# Patient Record
Sex: FEMALE | Race: WHITE | Employment: FULL TIME | URBAN - METROPOLITAN AREA
[De-identification: names, ages, dates, MRNs, and addresses within clinical notes are randomized per-mention and may not be internally consistent; named-entity substitution may affect disease eponyms.]

---

## 2023-06-19 ENCOUNTER — HOSPITAL ENCOUNTER (OUTPATIENT)
Dept: ULTRASOUND IMAGING | Facility: CLINIC | Age: 28
Discharge: HOME | End: 2023-06-19
Attending: FAMILY MEDICINE | Admitting: FAMILY MEDICINE
Payer: COMMERCIAL

## 2023-06-19 ENCOUNTER — OFFICE VISIT (OUTPATIENT)
Dept: PEDIATRICS | Facility: CLINIC | Age: 28
End: 2023-06-19
Payer: COMMERCIAL

## 2023-06-19 ENCOUNTER — OFFICE VISIT (OUTPATIENT)
Dept: URGENT CARE | Facility: URGENT CARE | Age: 28
End: 2023-06-19
Payer: COMMERCIAL

## 2023-06-19 VITALS
WEIGHT: 162 LBS | DIASTOLIC BLOOD PRESSURE: 85 MMHG | HEIGHT: 70 IN | OXYGEN SATURATION: 100 % | BODY MASS INDEX: 23.19 KG/M2 | SYSTOLIC BLOOD PRESSURE: 128 MMHG | HEART RATE: 64 BPM | TEMPERATURE: 97.5 F

## 2023-06-19 VITALS
OXYGEN SATURATION: 100 % | DIASTOLIC BLOOD PRESSURE: 79 MMHG | SYSTOLIC BLOOD PRESSURE: 127 MMHG | RESPIRATION RATE: 15 BRPM | TEMPERATURE: 98.8 F | HEART RATE: 69 BPM

## 2023-06-19 DIAGNOSIS — R10.2 HYPOGASTRIC PAIN: ICD-10-CM

## 2023-06-19 DIAGNOSIS — R10.2 PELVIC PAIN IN FEMALE: Primary | ICD-10-CM

## 2023-06-19 DIAGNOSIS — R10.31 RLQ ABDOMINAL PAIN: ICD-10-CM

## 2023-06-19 DIAGNOSIS — R10.31 RLQ ABDOMINAL PAIN: Primary | ICD-10-CM

## 2023-06-19 LAB
ALBUMIN SERPL BCG-MCNC: 4.4 G/DL (ref 3.5–5.2)
ALP SERPL-CCNC: 57 U/L (ref 35–104)
ALT SERPL W P-5'-P-CCNC: 17 U/L (ref 0–50)
ANION GAP SERPL CALCULATED.3IONS-SCNC: 9 MMOL/L (ref 7–15)
AST SERPL W P-5'-P-CCNC: 16 U/L (ref 0–45)
BASOPHILS # BLD AUTO: 0 10E3/UL (ref 0–0.2)
BASOPHILS NFR BLD AUTO: 0 %
BILIRUB SERPL-MCNC: 0.3 MG/DL
BUN SERPL-MCNC: 10.7 MG/DL (ref 6–20)
CALCIUM SERPL-MCNC: 9.8 MG/DL (ref 8.6–10)
CHLORIDE SERPL-SCNC: 105 MMOL/L (ref 98–107)
CREAT SERPL-MCNC: 0.77 MG/DL (ref 0.51–0.95)
DEPRECATED HCO3 PLAS-SCNC: 26 MMOL/L (ref 22–29)
EOSINOPHIL # BLD AUTO: 0 10E3/UL (ref 0–0.7)
EOSINOPHIL NFR BLD AUTO: 0 %
ERYTHROCYTE [DISTWIDTH] IN BLOOD BY AUTOMATED COUNT: 11.7 % (ref 10–15)
GFR SERPL CREATININE-BSD FRML MDRD: >90 ML/MIN/1.73M2
GLUCOSE SERPL-MCNC: 101 MG/DL (ref 70–99)
HCG SERPL QL: NEGATIVE
HCT VFR BLD AUTO: 39.4 % (ref 35–47)
HGB BLD-MCNC: 13.5 G/DL (ref 11.7–15.7)
IMM GRANULOCYTES # BLD: 0 10E3/UL
IMM GRANULOCYTES NFR BLD: 0 %
LYMPHOCYTES # BLD AUTO: 2.4 10E3/UL (ref 0.8–5.3)
LYMPHOCYTES NFR BLD AUTO: 47 %
MCH RBC QN AUTO: 32.4 PG (ref 26.5–33)
MCHC RBC AUTO-ENTMCNC: 34.3 G/DL (ref 31.5–36.5)
MCV RBC AUTO: 95 FL (ref 78–100)
MONOCYTES # BLD AUTO: 0.3 10E3/UL (ref 0–1.3)
MONOCYTES NFR BLD AUTO: 6 %
NEUTROPHILS # BLD AUTO: 2.4 10E3/UL (ref 1.6–8.3)
NEUTROPHILS NFR BLD AUTO: 47 %
NRBC # BLD AUTO: 0 10E3/UL
NRBC BLD AUTO-RTO: 0 /100
PLATELET # BLD AUTO: 197 10E3/UL (ref 150–450)
POTASSIUM SERPL-SCNC: 4.6 MMOL/L (ref 3.4–5.3)
PROT SERPL-MCNC: 7 G/DL (ref 6.4–8.3)
RBC # BLD AUTO: 4.17 10E6/UL (ref 3.8–5.2)
SODIUM SERPL-SCNC: 140 MMOL/L (ref 136–145)
WBC # BLD AUTO: 5.1 10E3/UL (ref 4–11)

## 2023-06-19 PROCEDURE — 85025 COMPLETE CBC W/AUTO DIFF WBC: CPT

## 2023-06-19 PROCEDURE — 80053 COMPREHEN METABOLIC PANEL: CPT

## 2023-06-19 PROCEDURE — 76830 TRANSVAGINAL US NON-OB: CPT

## 2023-06-19 PROCEDURE — 99204 OFFICE O/P NEW MOD 45 MIN: CPT

## 2023-06-19 PROCEDURE — 84703 CHORIONIC GONADOTROPIN ASSAY: CPT

## 2023-06-19 PROCEDURE — 99207 REFERRAL TO ACUTE AND DIAGNOSTIC SERVICES: CPT

## 2023-06-19 PROCEDURE — 36415 COLL VENOUS BLD VENIPUNCTURE: CPT

## 2023-06-19 NOTE — PROGRESS NOTES
Assessment & Plan   Problem List Items Addressed This Visit    None  Visit Diagnoses     Pelvic pain in female    -  Primary    Relevant Orders    CBC with platelets differential (Completed)    Comprehensive metabolic panel (Completed)    HCG qualitative (Completed)    US Pelvic Complete with Transvaginal (Completed)    Hypogastric pain              CS ACUTE [facility] DIAG [facility] PROVIDER  [facility] HAL West is a 27 year old, presenting for the following health issues:  Abdominal Pain (Abdominal pain- 8 days, since last monday)    HPI     Abdominal/Flank Pain  Onset/Duration: eight days   Description:   Character: Dull ache  Location: right lower quadrant  Radiation: None  Intensity: 3/10  Progression of Symptoms:  intermittent  Accompanying Signs & Symptoms:  Fever/chills: no   Gas/Bloating: YES  Nausea: no   Vomitting: no   Diarrhea: no   Constipation:YES  Dysuria: no            Hematuria: no            Frequency: no            Incontinence of urine: no   History:   Trauma: no   Previous similar pain: YES- previous cysts   Previous tests done: US two years ago           Previous Abdominal surgery: no   Precipitating factors:   Does the pain change with:     Food: no      Bowel Movement: no     Urination: no              Other factors: no   Therapies tried and outcome:  Ibuprofen     When food last eaten: this morning    27-year-old female presented today planing of 8 days of right lower quadrant/hypogastric pain.  The pain is mild up to a 3/10 intensity.  Patient has had previous ovarian cyst.  She states she is currently at her midcycle, trying to get pregnant.  LMP = June 5, 2023; usual period lasting 4-5 days; 25-day cycle.  Normal urination.  No STD concerns.  No abnormal discharge.  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  Denies any trauma or injuries.      Review of Systems   See HPI.  No nausea/vomiting.  No fever/chills.  No chest pain/SOB.   No BM/urine problems.  No dizziness or syncope.        Objective    /79 (BP Location: Right arm, Patient Position: Chair, Cuff Size: Adult Regular)   Pulse 69   Temp 98.8  F (37.1  C) (Oral)   Resp 15   LMP 06/05/2023   SpO2 100%   There is no height or weight on file to calculate BMI.  Physical Exam   GENERAL: healthy, alert and no distress; afebrile, not septic, no cyanosis or accessory muscle use, moist mucous membrane  HEENT:normal, no adenopathy, neck supple, no adenopathy  RESP: lungs clear   CV: regular rate and rhythm, normal heart sounds, no murmur  ABDOMEN: soft, nontender, no hepatosplenomegaly, no masses and bowel sounds normal; negative Molina negative McBurney, no CVA tenderness; mild tenderness at the right suprapubic region without rebound; no hernia  MS: no gross musculoskeletal defects noted, no edema  SKIN: no suspicious lesions or rashes  NEURO: Normal strength and tone, mentation intact and speech normal  BACK: no CVA tenderness, no paralumbar tenderness    Labs: Serum pregnancy test negative.  CBC and differential and CMP essentially normal.    Imaging: Pelvic and transvaginal ultrasound normal--- no ovarian cysts or torsion; no ectopic pregnancy.    Assessment: No pathology found for right lower quadrant/hypogastric pain.   Could be musculoskeletal.  Clinically benign.    Plan: Hypogastric pain could be musculoskeletal, likely self-limiting.  Close observation for now.  May use acetaminophen as needed as directed for pain.  Follow-up primary care physician within 2 weeks if no improvement or new problems arise.  Warning signs and symptoms explained, to be seen ASAP if worsening.

## 2023-06-19 NOTE — PROGRESS NOTES
"  Assessment & Plan     RLQ abdominal pain    - Referral to Acute and Diagnostic Services (Day of diagnostic / First order acute); Future    Referred to ADS to eval for possible ovarian cyst.  Appreciate consult.    Jenna David MD   Urgent Care Provider  [facility] HAL West is a 27 year old, presenting for the following health issues:  Abdominal Pain (Pain in RLQ x 8 days - pt is sometimes, constipation -nausea intermittent )    HPI     Worsening RLQ pain x 8 days.  LMP 6/5/2023.  Trying to get pregnant.  No fever or chills.  Has had constipation in past.  Feels pain has not been severe but has been constant.  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    Review of Systems   Constitutional, HEENT, cardiovascular, pulmonary, GI, , musculoskeletal, neuro, skin, endocrine and psych systems are negative, except as otherwise noted.      Objective    /85   Pulse 64   Temp 97.5  F (36.4  C) (Tympanic)   Ht 1.765 m (5' 9.5\")   Wt 73.5 kg (162 lb)   LMP 06/05/2023   SpO2 100%   BMI 23.58 kg/m    Body mass index is 23.58 kg/m .  Physical Exam   GENERAL: healthy, alert and no distress  EYES: Eyes grossly normal to inspection, PERRL and conjunctivae and sclerae normal  ABDOMEN: RLQ tenderness, no guarding or rebound  MS: no gross musculoskeletal defects noted, no edema  PSYCH: mentation appears normal, affect normal/bright                    "

## 2023-06-21 NOTE — PROGRESS NOTES
Jenna is a 27 year old No obstetric history on file. female who presents for annual exam.     Besides routine health maintenance, she has no other health concerns today.    HPI:  The patient's PCP is Physician No Ref-Primary.  New patient to me here today for her annual GYN exam.  She is due for a Pap smear today.  She unfortunately had a spontaneous miscarriage this past winter.  Her cycles have resumed at their normalcy.  She does have some cramping but is abated by ibuprofen.    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      GYNECOLOGIC HISTORY:    Patient's last menstrual period was 2023.    Regular menses? yes  Menses every 23-25 days.  Length of menses: 4-5 days    Her current contraception method is: none, currently trying to conceive.  She  reports that she has never smoked. She has never used smokeless tobacco.    Patient is sexually active.  STD testing offered?  Declined    Last GAD7 score on record =       2023     8:31 AM   JASON-7 SCORE   Total Score 0     Alcohol Score = 2    HEALTH MAINTENANCE:    Overdue    Never   Done ADVANCE CARE PLANNING (Every 5 Years)     Never   Done HEPATITIS B IMMUNIZATION (1 of 3 - 3-dose series)     Never   Done COVID-19 Vaccine (1)     Never   Done HIV SCREENING (Once)     Never   Done HEPATITIS C SCREENING (Once)     Never   Done PAP (Every 3 Years)     Never   Done DTAP/TDAP/TD IMMUNIZATION (1 - Tdap)     Upcoming    SEP 1   2023 INFLUENZA VACCINE (Season Ended)         Health maintenance updated:  yes    HISTORY:  OB History    Para Term  AB Living   1 0 0 0 1 0   SAB IAB Ectopic Multiple Live Births   1 0 0 0 0      # Outcome Date GA Lbr Adam/2nd Weight Sex Delivery Anes PTL Lv   1 SAB 23 5w0d              There is no problem list on file for this patient.    Past Surgical History:   Procedure Laterality Date     TONSILLECTOMY & ADENOIDECTOMY  2017      Social History  "    Tobacco Use     Smoking status: Never     Smokeless tobacco: Never   Substance Use Topics     Alcohol use: Yes      Problem (# of Occurrences) Relation (Name,Age of Onset)    Diabetes (1) Maternal Grandmother    Hypertension (1) Father    Breast Cancer (1) Paternal Grandmother    Prostate Cancer (1) Maternal Grandfather    Squamous cell carcinoma (1) Paternal Grandmother: tongue    Hyperlipidemia (1) Mother    Bladder Cancer (1) Paternal Grandfather    Leukemia (1) Maternal Grandmother    Lung Cancer (1) Paternal Grandmother            Current Outpatient Medications   Medication Sig     Prenatal Vit-Fe Fumarate-FA (PRENATAL MULTIVITAMIN W/IRON) 27-0.8 MG tablet Take 1 tablet by mouth daily     No current facility-administered medications for this visit.     No Known Allergies    Past medical, surgical, social and family histories were reviewed and updated in EPIC.    ROS:   12 point review of systems negative other than symptoms noted below or in the HPI.  No urinary frequency or dysuria, bladder or kidney problems, Normal menstrual cycles    EXAM:  /62   Ht 1.759 m (5' 9.25\")   Wt 72.6 kg (160 lb)   LMP 06/05/2023   BMI 23.46 kg/m     BMI: Body mass index is 23.46 kg/m .    PHYSICAL EXAM:  Constitutional:   Appearance: Well nourished, well developed, alert, in no acute distress  Neck:  Lymph Nodes:  No lymphadenopathy present    Thyroid:  Gland size normal, nontender, no nodules or masses present  on palpation  Chest:  Respiratory Effort:  Breathing unlabored  Cardiovascular:    Heart: Auscultation:  Regular rate, normal rhythm, no murmurs present  Breasts: Inspection of Breasts:  No lymphadenopathy present., Palpation of Breasts and Axillae:  No masses present on palpation, no breast tenderness., Axillary Lymph Nodes:  No lymphadenopathy present. and No nodularity, asymmetry or nipple discharge bilaterally.  Gastrointestinal:   Abdominal Examination:  Abdomen nontender to palpation, tone normal " without rigidity or guarding, no masses present, umbilicus without lesions   Liver and Spleen:  No hepatomegaly present, liver nontender to palpation    Hernias:  No hernias present  Lymphatic: Lymph Nodes:  No other lymphadenopathy present  Skin:  General Inspection:  No rashes present, no lesions present, no areas of  discoloration  Neurologic:    Mental Status:  Oriented X3.  Normal strength and tone, sensory exam                grossly normal, mentation intact and speech normal.    Psychiatric:   Mentation appears normal and affect normal/bright.         Pelvic Exam:  External Genitalia:     Normal appearance for age, no discharge present, no tenderness present, no inflammatory lesions present, color normal  Vagina:     Normal vaginal vault without central or paravaginal defects, no discharge present, no inflammatory lesions present, no masses present  Bladder:     Nontender to palpation  Urethra:   Urethral Body:  Urethra palpation normal, urethra structural support normal   Urethral Meatus:  No erythema or lesions present  Cervix:     Appearance healthy, no lesions present, nontender to palpation, no bleeding present  Uterus:     Uterus: firm, normal sized and nontender, anteverted in position.   Adnexa:     No adnexal tenderness present, no adnexal masses present  Perineum:     Perineum within normal limits, no evidence of trauma, no rashes or skin lesions present  Anus:     Anus within normal limits, no hemorrhoids present  Inguinal Lymph Nodes:     No lymphadenopathy present  Pubic Hair:     Normal pubic hair distribution for age  Genitalia and Groin:     No rashes present, no lesions present, no areas of discoloration, no masses present      COUNSELING:   Special attention given to:        Regular exercise       Healthy diet/nutrition       Family planning    BMI: Body mass index is 23.46 kg/m .      ASSESSMENT:  27 year old female with satisfactory annual exam.    ICD-10-CM    1. Encounter for  gynecological examination without abnormal finding  Z01.419 Pap screen reflex to HPV if ASCUS - recommend age 25 - 29          PLAN:  27-year-old female with a normal exam.  She is to call with a positive pregnancy test.  Pap smear was collected and if it is normal she can repeat in 3 years.    MARIAMA Shrestha CNP

## 2023-06-22 ENCOUNTER — OFFICE VISIT (OUTPATIENT)
Dept: OBGYN | Facility: CLINIC | Age: 28
End: 2023-06-22
Payer: COMMERCIAL

## 2023-06-22 VITALS
DIASTOLIC BLOOD PRESSURE: 62 MMHG | WEIGHT: 160 LBS | SYSTOLIC BLOOD PRESSURE: 104 MMHG | BODY MASS INDEX: 23.7 KG/M2 | HEIGHT: 69 IN

## 2023-06-22 DIAGNOSIS — Z01.419 ENCOUNTER FOR GYNECOLOGICAL EXAMINATION WITHOUT ABNORMAL FINDING: Primary | ICD-10-CM

## 2023-06-22 PROCEDURE — G0145 SCR C/V CYTO,THINLAYER,RESCR: HCPCS | Performed by: NURSE PRACTITIONER

## 2023-06-22 PROCEDURE — 99385 PREV VISIT NEW AGE 18-39: CPT | Performed by: NURSE PRACTITIONER

## 2023-06-22 RX ORDER — PRENATAL VIT/IRON FUM/FOLIC AC 27MG-0.8MG
1 TABLET ORAL DAILY
COMMUNITY

## 2023-06-22 ASSESSMENT — ANXIETY QUESTIONNAIRES
3. WORRYING TOO MUCH ABOUT DIFFERENT THINGS: NOT AT ALL
5. BEING SO RESTLESS THAT IT IS HARD TO SIT STILL: NOT AT ALL
1. FEELING NERVOUS, ANXIOUS, OR ON EDGE: NOT AT ALL
2. NOT BEING ABLE TO STOP OR CONTROL WORRYING: NOT AT ALL
GAD7 TOTAL SCORE: 0
6. BECOMING EASILY ANNOYED OR IRRITABLE: NOT AT ALL
GAD7 TOTAL SCORE: 0
7. FEELING AFRAID AS IF SOMETHING AWFUL MIGHT HAPPEN: NOT AT ALL

## 2023-06-22 ASSESSMENT — PATIENT HEALTH QUESTIONNAIRE - PHQ9
5. POOR APPETITE OR OVEREATING: NOT AT ALL
SUM OF ALL RESPONSES TO PHQ QUESTIONS 1-9: 0

## 2023-06-26 LAB
BKR LAB AP GYN ADEQUACY: NORMAL
BKR LAB AP GYN INTERPRETATION: NORMAL
BKR LAB AP HPV REFLEX: NORMAL
BKR LAB AP PREVIOUS ABNORMAL: NORMAL
PATH REPORT.COMMENTS IMP SPEC: NORMAL
PATH REPORT.COMMENTS IMP SPEC: NORMAL
PATH REPORT.RELEVANT HX SPEC: NORMAL

## 2023-07-12 ENCOUNTER — VIRTUAL VISIT (OUTPATIENT)
Dept: OBGYN | Facility: CLINIC | Age: 28
End: 2023-07-12
Payer: COMMERCIAL

## 2023-07-12 DIAGNOSIS — Z34.01 ENCOUNTER FOR SUPERVISION OF NORMAL FIRST PREGNANCY IN FIRST TRIMESTER: Primary | ICD-10-CM

## 2023-07-12 DIAGNOSIS — O36.80X0 PREGNANCY WITH INCONCLUSIVE FETAL VIABILITY: ICD-10-CM

## 2023-07-12 PROCEDURE — 99207 PR NO CHARGE NURSE ONLY: CPT

## 2023-07-12 NOTE — PROGRESS NOTES
SUBJECTIVE:     HPI:    This is a 28 year old female patient,  who presents for her first obstetrical visit.    RAO: 3/11/2024, by Last Menstrual Period.  She is 5w2d weeks.  Her cycles are regular.  Her last menstrual period was normal.   Since her LMP, she has had no complaints).   She denies nausea, emesis, abdominal pain, fatigue, headache, loss of appetite, vaginal discharge, dysuria, pelvic pain, urinary urgency, lightheadedness, urinary frequency, vaginal bleeding, hemorrhoids and constipation.    Additional History: [employer]  Nervous with hx of miscarriage    Pt's nephew Dx with Down Syndrome and Heart defect. Declines NIPS at this time as it would not change anything.    [facility] [email or web address]    Have you travelled during the pregnancy?No  Have your sexual partner(s) travelled during the pregnancy?No      HISTORY:   Planned Pregnancy: Yes  Marital Status:   Occupation: CT Tech  Living in Household: Spouse    Past History:  Her past medical history   Past Medical History:   Diagnosis Date     Known health problems: none    .      She has a history of  First Pregnancy    Since her last LMP she denies use of alcohol, tobacco and street drugs.    Past medical, surgical, social and family history were reviewed and updated in [email or web address].      Current Outpatient Medications   Medication     Prenatal Vit-Fe Fumarate-FA (PRENATAL MULTIVITAMIN W/IRON) 27-0.8 MG tablet     No current facility-administered medications for this visit.       ROS:       OBJECTIVE:     EXAM:  LMP 2023  There is no height or weight on file to calculate BMI.      ASSESSMENT/PLAN:       ICD-10-CM    1. Encounter for supervision of normal first pregnancy in first trimester  Z34.01           28 year old , 5w2d weeks of pregnancy with RAO of 3/11/2024, by Last Menstrual Period    Nurse phone visit completed. Prenatal book and folder (containing standard educational hand-outs and brochures) will be given at next visit to  patient. Information in folder reviewed over the phone. Questions answered. Brochure given on optional screening available to assess chromosomal anomalies. Pt declines NIPS. Pt advised to call the clinic if she has any questions or concerns related to her pregnancy. Prenatal labs future ordered. New prenatal visit scheduled on 8/2 with Nael Daley RN on 7/12/2023 at 8:47 AM      No results found for: PAP  PHQ-9 score:        7/10/2023     2:46 PM   PHQ   PHQ-9 Total Score 0   Q9: Thoughts of better off dead/self-harm past 2 weeks Not at all                   6/22/2023     8:31 AM 7/10/2023     2:45 PM   JASON-7 SCORE   Total Score  0 (minimal anxiety)   Total Score 0 0             Patient supplied answers from flow sheet for:  Prenatal OB Questionnaire.  Past Medical History  Have you ever recieved care for your mental health? : No  Have you ever been in a major accident or suffered serious trauma?: No  Within the last year, has anyone hit, slapped, kicked or otherwise hurt you?: No  In the last year, has anyone forced you to have sex when you didn't want to?: No    Past Medical History 2   Have you ever received a blood transfusion?: No  Would you accept a blood transfusion if was medically recommended?: Yes  Does anyone in your home smoke?: No   Is your blood type Rh negative?: No  Have you ever ?: No  Have you been hospitalized for a nonsurgical reason excluding normal delivery?: No  Have you ever had an abnormal pap smear?: No    Past Medical History (Continued)  Do you have a history of abnormalities of the uterus?: No  Did your mother take ARDEN or any other hormones when she was pregnant with you?: No  Do you have any other problems we have not asked about which you feel may be important to this pregnancy?: No

## 2023-07-12 NOTE — PATIENT INSTRUCTIONS
"    Pregnancy: Your First Trimester Changes  The first trimester is a time of rapid development for your baby. Because your baby is growing so quickly, it is important that you start a healthy lifestyle right away. By the end of the first trimester, your baby has formed all of its major body organs and weighs just over an ounce.   Month 1 (weeks 1 to 4)  The placenta (the organ that nourishes your baby) begins to form. The brain, spinal cord, heart, gastrointestinal tract, and lungs begin to develop. Your baby is about   inch long by the end of the first month.      Actual size of baby is 1/4\".     Month 2 (weeks 5 to 8)  All of your baby s major body organs form. The face, fingers, toes, ears, and eyes appear. By the end of the month, your baby is about 1 inch long.      Actual size of baby is 1\".     Month 3 (weeks 9 to 12)  Your baby can open and close its fists and mouth. The sexual organs begin to form. As the first trimester ends, your baby is about 3 inches long.      Actual size of baby is 3\".     [employer] last reviewed this educational content on 12/1/2022 2000-2023 [employer]. All rights reserved. This information is not intended as a substitute for professional medical care. Always follow your healthcare professional's instructions.          Vitamin B6 (pyridoxine) Oral Tablet  Uses  This medicine is used for the following purposes:    metabolism disorder    nausea and vomiting    vitamin deficiency  Instructions  This medicine may be taken with or without food.  Store at room temperature away from heat, light, and moisture. Do not keep in the bathroom.  If you forget to take a dose on time, take it as soon as you remember. If it is almost time for the next dose, do not take the missed dose. Return to your normal dosing schedule. Do not take 2 doses of this medicine at one time.  Drug interactions can change how medicines work or increase risk for side effects. Tell your health care " providers about all medicines taken. Include prescription and over-the-counter medicines, vitamins, and herbal medicines. Speak with your doctor or pharmacist before starting or stopping any medicine.  Speak with your doctor or pharmacist before starting any other vitamins.  It is very important that you follow your doctor's instructions for all blood tests.  Cautions  Tell your doctor and pharmacist if you ever had an allergic reaction to a medicine.  Do not use the medication any more than instructed.  Tell the doctor or pharmacist if you are pregnant, planning to be pregnant, or breastfeeding.  Side Effects  If you have any of the following side effects, you may be getting too much medicine. Please contact your doctor to let them know about these side effects.    drowsiness or sedation    numbness or tingling in hands and feet    headaches    nausea    stomach upset or abdominal pain  This medicine usually has no side effects.  A few people may have an allergic reaction to this medicine. Symptoms can include difficulty breathing, skin rash, itching, swelling, or severe dizziness. If you notice any of these symptoms, seek medical help quickly.  Extra  Please speak with your doctor, nurse, or pharmacist if you have any questions about this medicine.  [email or web address]  IMPORTANT NOTE: This document tells you briefly how to take your medicine, but it does not tell you all there is to know about it. Your doctor or pharmacist may give you other documents about your medicine. Please talk to them if you have any questions. Always follow their advice. There is a more complete description of this medicine available in English. Scan this code on your smartphone or tablet or use the web address below. You can also ask your pharmacist for a printout. If you have any questions, please ask your pharmacist. The display and use of this drug information is subject to Terms of Use. Copyright(c) 2022 [employer]     9163-6786 [employer]. All rights reserved. This information is not intended as a substitute for professional medical care. Always follow your healthcare professional's instructions.          Adapting to Pregnancy: First Trimester  As your body adjusts during your first trimester of pregnancy, you may have to change or limit your daily activities. You ll need more rest. You may also need to use the energy you have more wisely.  Your changing body  Almost every part of your body is affected as you adapt to pregnancy. The uterus and cervix will start to soften right away. You may not look very pregnant during the first 3 months. But you are likely to have some common signs of early pregnancy:    Nausea    Fatigue    Frequent urination    Mood swings    Bloating of the belly    Constipation    Heartburn    Missed or light periods (first trimester bleeding)    Nipple or breast tenderness and breast swelling  It s not too late to start good habits  What matters most is protecting your baby from this moment on. If you smoke, drink alcohol, or use drugs, now is the time to stop. If you need help, talk with your healthcare provider:    Smoking increases the risk of stillbirth or having a low-birth-weight baby. If you smoke, quit now.    Alcohol and drugs have been linked with miscarriage, birth defects, intellectual disability, and low birth weight. Don't drink alcohol or take drugs.  Tips to relieve nausea  During pregnancy, nausea can happen at any time of the day, but it may be worse in the morning. To help prevent nausea:    Eat small, light meals at frequent intervals.    Drink fluids often.    Get up slowly. Eat a few unsalted crackers before you get out of bed.    Avoid smells that bother you.    Avoid spicy and fatty foods.    Eat an ice pop in your favorite flavor.    Get plenty of rest.    Ask your healthcare provider about taking sharon or vitamin B6 for nausea and  vomiting.    Talk with your healthcare provider if you take vitamins that upset your stomach.  Work concerns  The end of the first trimester is a good time to discuss working during pregnancy with your employer. Follow your healthcare provider s advice if your job needs you to stand for a long time, work with hazardous tools, or even sit at a desk all day. Your workspace, workload, or scheduled hours may need to be adjusted. Perhaps you can change body postures more often or take an extra break.  Advice for travel  Talk to your healthcare provider first, but the second trimester may be the best time for any travel. You may be advised to avoid certain trips while you re pregnant. Food and water can be concerns in developing countries. Travel by car is a good choice, as you can stop, get out, and stretch. Bring snacks and water along. Fasten the lap belt below your belly, low over your hips. Also be sure to wear the shoulder harness.  Intimacy  Unless your healthcare provider tells you to, there's no reason to stop having sex while you re pregnant. You or your partner may notice changes in desire. Desire may be less in the first trimester due to nausea and fatigue. In the second trimester, sex may be very enjoyable. The third trimester can be a challenge comfort-wise. Try different positions and see what s best for you both.     How daily issues affect your health  Many things in your daily life impact your health. This can include transportation, money problems, housing, access to food, and . If you can t get to medical appointments, you may not receive the care you need. When money is tight, it may be difficult to pay for medicines. And living far from a grocery store can make it hard to buy healthy food.  If you have concerns in any of these or other areas, talk with your healthcare team. They may know of local resources to assist you. Or they may have a staff person who can help.  [employer] last reviewed  this educational content on 11/1/2022 2000-2023 [employer]. All rights reserved. This information is not intended as a substitute for professional medical care. Always follow your healthcare professional's instructions.          Pregnancy: Common Questions  There are plenty of myths and  old wives  tales  about pregnancy. You may need help  fact from fiction. On this sheet, you ll find answers to a few common questions. If you have other questions, talk with your healthcare provider.     Will working harm my baby?  In most cases, working throughout your pregnancy is not harmful at all. There may be concerns if the job involves dangerous machinery or chemicals, lifting, or standing for very long periods of time. Talk with your healthcare provider and employer about your particular job and pregnancy.   Is it safe to have sex during pregnancy?   Yes, unless you are specifically advised not to by your healthcare provider.   Why can t I change the cat litter box?   Cats carry a disease called toxoplasmosis. In adult humans, it shows up as a mild infection of the blood and organs. If you are infected during pregnancy, the baby s brain and eyes could be damaged. To be safe, have someone else change the litter. If you must handle it, wear a paper mask over your nose and mouth. Also, wear gloves and wash your hands afterward.   Which medicines are safe?  No prescription or over-the-counter medicine is safe for everyone all of the time. But sometimes medicines are needed. Be sure your healthcare provider knows you are pregnant. Then use only the medicines they advise you to take.   Is it true that I can overheat my baby?   Yes. To prevent making your baby too warm:     Don t sit in a Jacuzzi. A long, warm bath is fine, but not in water over 100 F (37.7 C).    Exercise less intensely if you feel tired. Base your workout on how you feel, not on your heart rate. Heart rates aren t a good way to  measure effort during pregnancy.  Can I lift and carry safely?  Yes, if your healthcare provider doesn t tell you otherwise. Learn to lift and carry safely to prevent injury and reduce back pain during pregnancy. To protect your back:     Bend at the knees to bring the load nearer.    Get a good . Test the weight of the load.    Tighten your belly. Exhale as you lift.    Lift with your legs, not with your back.    Carry the load close to your body.    Hold the load so you can see where you are going.  What if I get sick?  Most people get sick at least once during pregnancy. Talk with your healthcare provider if you do. Most likely it will not affect your pregnancy. Get plenty of rest and fluids and eat what you can. Talk with your provider before taking any medicines.   [employer] last reviewed this educational content on 12/1/2022 2000-2023 [employer]. All rights reserved. This information is not intended as a substitute for professional medical care. Always follow your healthcare professional's instructions.          Comfort Tips During Pregnancy  Pregnancy can bring discomfort of different kinds. Below are tips for ways to feel better. Talk with your healthcare provider before using pain-relieving medicine at any time during your pregnancy.    First trimester tips  Easing nausea    Get up slowly. Eat a few unsalted crackers before you get out of bed.    Avoid smells that bother you.    Eat small, bland, low-fat, high-protein meals at frequent intervals.    Sip on water, weak tea, or clear soft drinks, like ginger ale. Eat ice chips.    Try taking vitamin B6.  Coping with fatigue    Take catnaps when you can.    Get regular exercise.    Accept help from others.    Practice good sleep habits, like going to bed and getting up at the same time each day. Use your bed only for sleep and sex.  Calming mood swings    Talk about your feelings with others, including other mothers.    Limit sugar,  chocolate, and caffeine.    Eat a healthy diet. Don t skip meals.    Get regular exercise.  Soothing headaches    Get fresh air and exercise.    Relax and get enough rest.    Check with your healthcare provider before taking any pain medicines.    Second trimester tips    To limit ankle swelling, sit with your feet raised or wear support hose.    If you have pain in your groin and stomach (round ligament pain), don't make sudden twisting movements with your body.    For leg cramps, flexing your foot often brings immediate relief. Also try massaging your calf in long, downward strokes, or stretching your legs before going to bed. Get enough exercise and wear shoes with flexible soles. Eat foods rich in calcium.    Third trimester tips  Reducing heartburn    Eat small, light meals throughout the day rather than 3 large ones.    Sleep with your upper body raised 6 inches. Don t lie down until 2 hours after you eat.    Don't eat greasy, fried, or spicy foods.    Don't have citrus fruits or juices.  Treating constipation    Eat foods high in fiber, such as whole-grain foods, and fresh fruit and vegetables).    Drink plenty of water.    Get regular exercise.    Ask about your healthcare provider about medicines that have docusate or psyllium.  Taking care of your breasts    Don't use harsh soaps or alcohol, which can make your skin too dry.    Wear nursing bras. They provide more support than regular bras and can be used after pregnancy if you breastfeed.  Getting a good night s sleep    Take a warm shower before bed.    Sleep on a firm mattress.    Lie on your side with 1 leg crossed over the other.    Use pillows to support your arms, legs, and belly.    [employer] last reviewed this educational content on 8/1/2020 2000-2023 [employer]. All rights reserved. This information is not intended as a substitute for professional medical care. Always follow your healthcare professional's  instructions.          Folic Acid Supplements  Folic acid is also called folate. It is one of the B vitamins. Nutrition experts are just starting to learn more about how folic acid helps the body. It is needed to prevent a shortage of red blood cells (a type of anemia). Experts have also found that folic acid can prevent some birth defects.     How much folic acid do you need?  Adults should have 400 mcg (micrograms) of folic acid each day. Adults may need more if they are planning to get pregnant, are pregnant, or are breastfeeding. Talk with your healthcare provider to find the right amount of folic acid for you.   Why use a supplement?  Taking folic acid both before and during pregnancy is important. This can prevent birth defects of the spine and brain (neural tube defects). A supplement may also be helpful if you drink alcohol often. You may want to use a folic acid supplement if any of the following is true for you:   ? I am a person of childbearing age.   ? I am planning to get pregnant.  ? I rarely eat leafy green vegetables, dried beans, or lentils.   ? I rarely eat cereal and whole grains (wheat germ, brown rice, whole-wheat bread).  ? I often have more than 1 drink of alcohol a day.   If you take folic acid  Here are some tips to help you get the most from a folic acid supplement:    Read the label to be sure the product won't  soon.    Choose a supplement that provides 400 to 800 mcg of folic acid.    Store the supplement in a cool, dry place, away from sun and heat.    Eat a healthy diet to get all the nutrients your body needs.  Foods that have folic acid  Folic acid is found mainly in plants. Some good sources include:    Dark green leafy vegetables such as spinach, kale, collards, and travis lettuce    Lentils, chickpeas, and dried beans such as hernandez, kidney, and black beans    Asparagus, bok jeana, broad-beans, broccoli, and [place] sprouts    Avocados, oranges, and orange juice    Wheat  germ and whole-wheat products    Products fortified with folic acid, such as cereal, pasta, bread, and rice  [employer] last reviewed this educational content on 8/1/2022 2000-2023 [employer]. All rights reserved. This information is not intended as a substitute for professional medical care. Always follow your healthcare professional's instructions.          Anemia During Pregnancy  Anemia is a condition in which the red blood cell count is too low. In pregnant women, this is often caused by not having enough iron in the blood. Anemia is common in pregnancy and very easy to treat.   Why you need iron   While pregnant, your body uses iron to make red blood cells for you and your baby. These cells bring oxygen to your baby and to the rest of your body. Not having enough red blood cells can cause your baby to be born too small. But this is rare, as it s easy for you to get enough iron.   Testing for anemia   The only way to know if you have anemia is to have a simple test called a CBC (complete blood count). This is a routine test that will be done at one of your first prenatal visits. This test may be done again, at about week 26 to week 28.   Treating anemia   If you have anemia due to low iron content, follow the advice of your healthcare provider. Eating foods high in iron and taking supplements can help you get the iron you need.   Eating foods high in iron      Green leafy vegetables and nuts are a good source of iron.     Eat foods that are high in iron such as:     Red meat (limit organ meats such as liver)    Seafood (be sure it s fully cooked), and don't eat fish that are high in mercury, such as swordfish, tilefish, caden mackerel, and shark    Tofu    Eggs    Green, leafy vegetables    Whole grains and iron-fortified cereals    Dried fruits and nuts  Taking iron supplements   In most cases, a prenatal vitamin can provide enough iron. But if you need more, your healthcare provider may  prescribe an iron supplement. Swallow iron pills with a glass of orange or cranberry juice. The vitamin C in these fruit juices can help your body absorb iron. But don t take your iron pills with juices that have calcium added to them. They can keep your body from absorbing the iron.   Iron supplements   Iron supplements may have certain side effects. They may cause your stools to turn black, and make you feel sick to your stomach or constipated. Here are some tips that may help you limit side effects:     Start slowly. Take 1 pill a day for a few days. Then work up to your prescribed dose over time.    Take your pills with meals, and not at bedtime.    Increase the fiber in your diet. Eat more whole grains, fruits, and vegetables.    Do mild exercise each day.    If advised by your healthcare provider, take a stool softener.  [employer] last reviewed this educational content on 2/1/2020 2000-2023 [employer]. All rights reserved. This information is not intended as a substitute for professional medical care. Always follow your healthcare professional's instructions.          Rh-Negative Screening  If you have Rh-negative blood, your baby may be at risk for health problems. This is true only if your baby has Rh-positive blood. A simple test followed by treatment can help prevent problems.   What are the risks?  If the blood of your baby is Rh positive, your Rh-negative blood may form antibodies. These antibodies will attack the Rh-positive blood. This is called Rh disease. Rh disease can cause your baby to lose blood cells or have other health problems. Medical treatment can prevent Rh disease by keeping antibodies from forming.   How are you tested?  A simple blood test shows if you re Rh negative. This test is done very early in your pregnancy. If you re Rh negative, you ll have a second blood test near week 28 of pregnancy. This test will check whether or not your blood contains Rh antibodies.      [employer] last reviewed this educational content on 12/1/2022 2000-2023 [employer]. All rights reserved. This information is not intended as a substitute for professional medical care. Always follow your healthcare professional's instructions.          What Is Prenatal Care?  Before getting pregnant, you may have added some good health habits to get ready for your baby. But if you didn t, start today. One of the first steps is learning how to take care of yourself. See your healthcare provider as soon as you think you may be pregnant. Then continue prenatal care during your pregnancy.     Prenatal care helps you have a healthy baby   During prenatal care:    Your healthcare provider checks the health of your pregnancy. They'll calculate a due date. This gives an estimate of your baby's delivery. Many people give birth between 38 and 41 weeks of pregnancy. Your due date is found by counting 40 weeks from the first day of your last menstrual period.    Your pregnancy's progress is checked. This includes your baby s growth, fetal heart rate, changes in your weight and blood pressure, and your overall health and comfort.    Your provider may find new concerns and manage current ones before problems happen.    Your provider will check lab work through blood and urine.    Your provider will talk about normal changes that happen during pregnancy. They'll also talk about changes that may not be normal. And they'll advise you about lifestyle changes.    Your provider will answer your questions. They'll also help you get ready for labor and delivery.  You're part of a team  When you re pregnant, you re part of a team. This team includes you, your baby, and your provider. It also may include a partner or a main support person. That could be a loved one, such as a spouse, a family member, or a friend. As you work to give your baby a healthy start, rely on your team members for support.   It s not too late  to start good habits   What matters most is protecting your baby from this moment on. If you smoke, drink alcohol, or use illegal drugs, now's the time to stop. If you need help, talk with your healthcare provider.     Smoking increases the risk of losing your baby. Or of having a low-birth-weight baby. If you smoke, quit now.    Alcohol and drugs have been linked with many problems. These include miscarriage, birth defects, intellectual disability, and low birth weight. Stay away from alcohol and drugs.    Eat a healthy diet. This helps keep you and your baby strong and healthy. Follow your provider's instructions for nutrition. Also stay within the guidelines you're given for healthy weight gain.    Take 400 micrograms to 800 micrograms (400 mcg to 800 mcg or 0.4 mg to 0.8 mg) of folic acid every day. Take it for at least 1 month before getting pregnant. And keep taking it for the first trimester of your pregnancy. This is to lower your risk of some brain and spinal birth defects. You can get folic acid from some foods. But it's hard to get all the folic acid you'll need from foods alone. Talk with your provider about taking a folic acid supplement.    Regular exercise will help you stay fit and feel good during pregnancy. It can also help prevent or reduce back pain. Talk with your provider about how to exercise safely during pregnancy.    If you have a health condition, make sure it's under control. Some conditions include asthma, diabetes, depression, high blood pressure, obesity, thyroid disease, or epilepsy. Be sure your vaccines are up-to-date.  How daily issues affect your health  Many things in your daily life impact your health. This can include transportation, money problems, housing, access to food, and childcare. If you can t get to medical appointments, you may not receive the care you need. When money is tight, it may be difficult to pay for medicines. And living far from a grocery store can make it  hard to buy healthy food.   If you have concerns in any of these or other areas, talk with your healthcare team. They may know of local resources to assist you. Or they may have a staff person who can help.   [employer] last reviewed this educational content on 3/1/2023    2725-4859 [employer]. All rights reserved. This information is not intended as a substitute for professional medical care. Always follow your healthcare professional's instructions.          Understanding Intimate Partner Violence  Intimate partner violence (IPV) affects hundreds of thousands of women. But the true number may be much higher because many women may not report it. Partner violence often starts or gets worse during and after pregnancy. This may be from the stress of pregnancy and a new baby. IPV can result in pregnancy complications, poor postpartum care, and poor health of the baby.   You may feel alone if you are experiencing intimate partner violence during or after your pregnancy but know that you re not. It s far more common than you think. And there s help and hope. Talk with your healthcare provider if you are not safe.   Types of intimate partner violence  Most people think of IPV as just physical abuse. While it does often include physical abuse, IPV can be emotional and sexual abuse. These other forms of abuse are sometimes harder to see. This is especially true for emotional abuse, because it can distort your own viewpoint.     Physical abuse includes using physical force to hurt or disable a partner. It can include throwing objects, pushing, kicking, biting, slapping, strangling, hitting, or beating.    Emotional abuse includes making threats and controlling money or other resources. It s anything that erodes a person s sense of self-worth. It may look like name-calling, blaming, and stalking. It can also include isolation from friends, family, and even access to healthcare.    Sexual violence includes rape,  "unwanted kissing, and forced touching. It also includes reproductive coercion. This is holding power and control over the woman s reproductive health. It may look like efforts to sabotage contraception, having unsafe sex to purposefully expose the woman to sexually transmitted infections. It may also include forced  or injuries to cause a miscarriage.  Are you at risk for IPV?  IPV affects all genders, races, ethnicities, and social and economic statuses. But some people are at greater risk for being a victim or an abuser. Certain factors can increase the risk for IPV.   Individual factors    Having low self-esteem, being emotional dependent, insecure, and jealous    Having low income or being unemployed, having recent job loss or job instability    Being younger    Using alcohol or drugs    Being depressed, angry, having PTSD, or being hostile towards women    Having a history of abusing others, being abused, or witnessing abuse    Having poor problem-solving skills    Having poor impulse control    Being a Native , , or -American woman  Relationship factors    Marital or relationship conflict, frequent fighting    Having a jealous or possessive partner    Having control issues    Being under economic stress    Having poor social support    Having income inequality    It often helps to ask yourself these questions from the [employer] of [employer] to know if you are in an abusive relationship:     Does my partner always put me down and make me feel bad about myself?    Has my partner caused harm or pain to my body?    Does my partner threaten me, the baby, my other children, or himself?    Does my partner blame me for his actions? Does he tell me it's my own fault he hit me?    Is my partner becoming more violent as time goes on?    Has my partner promised never to hurt me again, but still does?  If you answered \"yes\" to any of these questions, you may be in an unhealthy relationship.   If " you recognize yourself or your partner in any of these descriptors, talk with your healthcare provider to get help. If you are in danger, they can help you develop a safety plan.   Health effects  IPV during or after pregnancy can cause physical and emotional health effects, pregnancy complications, poor outcomes, and injury and harm to the baby after birth.   During pregnancy    Physical injuries, such as bruises, cuts, or broken bones    Vaginal bleeding or pelvic pain    Early labor and delivery    Tearing of the placenta (placental abruption)    Maternal death  Infant health    Low infant birth weight    Infant who needs care from a  intensive care unit    Broken bones    Death of   After birth  After birth, the mother may:    Have pain and other long-term health conditions    Develop postpartum depression (2 to 3 times greater risk)    Have high-risk sexual behaviors    Use harmful substances    Have unhealthy diet and lifestyle, such as eating disorders  Abuse is never OK. One in 6 abused women are first abused during pregnancy, when it s dangerous to both you and your developing baby. Recognizing that you are in an abusive relationship is the first step to help. See your healthcare provider or someone else you trust who can help you develop a safety plan.   What to expect from your healthcare provider  It can be hard to bring up partner violence with your healthcare provider. But it s an important first step in getting help. Rest assured, your conversation is confidential. They are a nonjudgmental health professional. They likely have other patients with similar problems and recognize the difficulty of the situation. Your provider may ask you questions, such as:     Do you feel safe in your relationship?    Do you have a safe place to go in an emergency?    Do conflicts ever turn into physical fights?  Answer honestly and completely. There will be no pressure to disclose the abuse or to press  "charges. They won t ask about the abuse in front of a partner, friends, or family. The goal is to help you access help when you are ready.   Call 911  If you feel your safety is in jeopardy now, call 911 or the police.   For more help  If the person who hurt you is your partner or spouse and your situation can become dangerous again, it's vital to make a safety plan. There are resources and organizations that can help you.     National Domestic Violence Hotline , www.DocuTAP, 608.210.6567, or text \"START\" to 12266. The National Domestic Violence Hotline can help you develop a plan that meets your personal situation. It is free, confidential, and available 24/7.    National Coalition Against Domestic Violence , www.ncadv.org    National Institutes of Health, hr.nih.gov/working-nih/civil/domestic-violence-resources  Polo last reviewed this educational content on 12/1/2022 2000-2023 [employer]. All rights reserved. This information is not intended as a substitute for professional medical care. Always follow your healthcare professional's instructions.          Bleeding During Early Pregnancy   If you ve had bleeding early in your pregnancy, you re not alone. Many other pregnant women have early bleeding, too. And in most cases, nothing is wrong. But your healthcare provider still needs to know about it. They may want to do tests to find out why you re bleeding. Call your provider if you see bleeding during pregnancy. Tell your provider if your blood is Rh negative. Then they can figure out if you need anti-D immune globulin treatment.   What causes early bleeding?   The cause of bleeding early in pregnancy is often unknown. But many factors early on in pregnancy may lead to light bleeding (called spotting) or heavier bleeding. These include:     Having sex    When the embryo implants on the uterine wall    Bleeding between the sac membrane and the uterus (subchorionic bleeding)    Pregnancy " loss (miscarriage)    The embryo implants outside of the uterus (ectopic pregnancy)  If you see spotting   Light bleeding is the most common type of bleeding in early pregnancy. If you see it, call your healthcare provider. Chances are, they will tell you that you can care for yourself at home.   If tests are needed   Depending on how much you bleed, your healthcare provider may ask you to come in for some tests. A pelvic exam, for instance, can help see how far along your pregnancy is. You also may have an ultrasound or a Doppler test. These imaging tests use sound waves to check the health of your baby. The ultrasound may be done on your belly or inside your vagina. You may also need a special blood test. This test compares your hormone levels in blood samples taken 2 days apart. The results can help your provider learn more about the implantation of the embryo. Your blood type will also need to be checked to assess if you will need to be treated for Rh sensitization.       Ultrasound can help check the health of your fetus.     Warning signs   If your bleeding doesn t stop or if you have any of the following, get medical care right away:     Soaking a sanitary pad each hour    Bleeding like you re having a period    Cramping or severe belly pain    Feeling dizzy or faint    Tissue passing through your vagina    Bleeding at any time after the first trimester  Questions you may be asked   Bleeding early in pregnancy isn't normal. But it is common. If you ve seen any bleeding, you may be concerned. But keep in mind that bleeding alone doesn t mean something is wrong. Just be sure to call your healthcare provider right away. They may ask you questions like these to help find the cause of your bleeding:     When did your bleeding start?    Is your bleeding very light or is it like a period?    Is the blood bright red or brownish?    Have you had sex recently?    Have you had pain or cramping?    Have you felt dizzy or  faint?  Monitoring your pregnancy   Bleeding will often stop as quickly as it began. Your pregnancy may go on a normal path again. You may need to make a few extra prenatal visits. But you and your baby will most likely be fine.   Polo last reviewed this educational content on 1/1/2022 2000-2023 [employer]. All rights reserved. This information is not intended as a substitute for professional medical care. Always follow your healthcare professional's instructions.

## 2023-07-14 ENCOUNTER — MYC MEDICAL ADVICE (OUTPATIENT)
Dept: OBGYN | Facility: CLINIC | Age: 28
End: 2023-07-14
Payer: COMMERCIAL
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
## 2023-07-31 ENCOUNTER — ANCILLARY PROCEDURE (OUTPATIENT)
Dept: ULTRASOUND IMAGING | Facility: CLINIC | Age: 28
End: 2023-07-31
Attending: OBSTETRICS & GYNECOLOGY
Payer: COMMERCIAL

## 2023-07-31 DIAGNOSIS — Z34.01 ENCOUNTER FOR SUPERVISION OF NORMAL FIRST PREGNANCY IN FIRST TRIMESTER: ICD-10-CM

## 2023-07-31 DIAGNOSIS — O36.80X0 PREGNANCY WITH INCONCLUSIVE FETAL VIABILITY: ICD-10-CM

## 2023-07-31 PROCEDURE — 76801 OB US < 14 WKS SINGLE FETUS: CPT

## 2023-08-01 LAB
ABO/RH(D): NORMAL
ANTIBODY SCREEN: NEGATIVE
SPECIMEN EXPIRATION DATE: NORMAL

## 2023-08-01 NOTE — PROGRESS NOTES
SUBJECTIVE:      HPI:     This is a 28 year old female patient,  who presents for her first obstetrical visit.     RAO: 3/06/2024, by US consistent with 23 day cycle.  She is 9 weeks.  Her cycles are regular.  Her last menstrual period was normal.   Since her LMP, she has had no complaints).   She denies nausea, emesis, abdominal pain, fatigue, headache, loss of appetite, vaginal discharge, dysuria, pelvic pain, urinary urgency, lightheadedness, urinary frequency, vaginal bleeding, hemorrhoids and constipation.     Additional History:   Has had some brown spotting. Does have history of 5 weeks miscarriage. Reports no additional episode of spotting since. No abdominal pain.     Endorses mild food aversion and nausea, but improves with consistent small meals.     Pt's nephew Dx with Down Syndrome and Heart defect. Declines NIPS at this time as it would not change anything.     [employer] [facility] [employer]     Have you travelled during the pregnancy?No  Have your sexual partner(s) travelled during the pregnancy?No        HISTORY:   Planned Pregnancy: Yes   Marital Status:   Occupation: [employer]  Living in Household: Spouse     Past History:  Her past medical history   Past Medical History        Past Medical History:   Diagnosis Date    Known health problems: none        .       She has a history of  First Pregnancy     Since her last LMP she denies use of alcohol, tobacco and street drugs.     Past medical, surgical, social and family history were reviewed and updated in [facility].            Current Outpatient Medications   Medication    Prenatal Vit-Fe Fumarate-FA (PRENATAL MULTIVITAMIN W/IRON) 27-0.8 MG tablet      No current facility-administered medications for this visit.         ROS:         OBJECTIVE:      EXAM:  LMP 2023  There is no height or weight on file to calculate BMI.        ASSESSMENT/PLAN:          ICD-10-CM     1. Encounter for supervision of normal first pregnancy in first trimester  Z34.01                28 year old , 5w2d weeks of pregnancy with RAO of 3/11/2024, by Last Menstrual Period     Nurse phone visit completed. Prenatal book and folder (containing standard educational hand-outs and brochures) will be given at next visit to patient. Information in folder reviewed over the phone. Questions answered. Brochure given on optional screening available to assess chromosomal anomalies. Pt declines NIPS. Pt advised to call the clinic if she has any questions or concerns related to her pregnancy. Prenatal labs future ordered. New prenatal visit scheduled on  with Nael Daley RN on 2023 at 8:47 AM        OBJECTIVE:     EXAM:  /70   Wt 71.2 kg (157 lb)   LMP 2023 (Exact Date)   BMI 23.02 kg/m   Body mass index is 23.02 kg/m .    GENERAL: healthy, alert and no distress  EYES: Eyes grossly normal to inspection, PERRL and conjunctivae and sclerae normal  NECK: no adenopathy, no asymmetry, masses, or scars and thyroid normal to palpation  MS: no gross musculoskeletal defects noted, no edema  PSYCH: mentation appears normal, affect normal/bright    ASSESSMENT/PLAN:       ICD-10-CM    1. Encounter for supervision of normal first pregnancy in first trimester  Z34.01 ABO/Rh type and screen     Hepatitis B surface antigen     HIV Antigen Antibody Combo     Rubella Antibody IgG     Treponema Abs w Reflex to RPR and Titer     Urine Culture Aerobic Bacterial     *UA reflex to Microscopic     Hepatitis C antibody      2. 9 weeks gestation of pregnancy  Z3A.09           28 year old , On 2023 patient is 8w1d weeks of pregnancy with RAO of 3/11/2024, by Last Menstrual Period    Discussed as follows:  -Discussed diet, exercise, and travel during pregnancy   -Advised on plan for upcoming tests, imaging, and expectations during pregnancy    Counseling given:   - Follow up in 4-6 weeks for return OB visit.  - Recommended weight gain for pregnancy: 25-35 lbs.  -normal  pap in 2023, plan repeat in 2026     PLAN/PATIENT INSTRUCTIONS:    -Follow up in 4 weeks   -First trimester labs today   -Declines NIPS    Adriana Nayak MD

## 2023-08-02 ENCOUNTER — PRENATAL OFFICE VISIT (OUTPATIENT)
Dept: OBGYN | Facility: CLINIC | Age: 28
End: 2023-08-02
Payer: COMMERCIAL

## 2023-08-02 VITALS — DIASTOLIC BLOOD PRESSURE: 70 MMHG | WEIGHT: 157 LBS | BODY MASS INDEX: 23.02 KG/M2 | SYSTOLIC BLOOD PRESSURE: 102 MMHG

## 2023-08-02 DIAGNOSIS — Z3A.09 9 WEEKS GESTATION OF PREGNANCY: ICD-10-CM

## 2023-08-02 DIAGNOSIS — Z34.01 ENCOUNTER FOR SUPERVISION OF NORMAL FIRST PREGNANCY IN FIRST TRIMESTER: Primary | ICD-10-CM

## 2023-08-02 LAB
ALBUMIN UR-MCNC: NEGATIVE MG/DL
APPEARANCE UR: CLEAR
BACTERIA #/AREA URNS HPF: ABNORMAL /HPF
BILIRUB UR QL STRIP: NEGATIVE
COLOR UR AUTO: YELLOW
GLUCOSE UR STRIP-MCNC: NEGATIVE MG/DL
HBV SURFACE AG SERPL QL IA: NONREACTIVE
HCV AB SERPL QL IA: NONREACTIVE
HGB UR QL STRIP: ABNORMAL
HIV 1+2 AB+HIV1 P24 AG SERPL QL IA: NONREACTIVE
KETONES UR STRIP-MCNC: NEGATIVE MG/DL
LEUKOCYTE ESTERASE UR QL STRIP: ABNORMAL
NITRATE UR QL: NEGATIVE
PH UR STRIP: 7 [PH] (ref 5–7)
RBC #/AREA URNS AUTO: ABNORMAL /HPF
RUBV IGG SERPL QL IA: 4.92 INDEX
RUBV IGG SERPL QL IA: POSITIVE
SP GR UR STRIP: 1.01 (ref 1–1.03)
SQUAMOUS #/AREA URNS AUTO: ABNORMAL /LPF
T PALLIDUM AB SER QL: NONREACTIVE
UROBILINOGEN UR STRIP-ACNC: 0.2 E.U./DL
WBC #/AREA URNS AUTO: ABNORMAL /HPF

## 2023-08-02 PROCEDURE — 81001 URINALYSIS AUTO W/SCOPE: CPT | Performed by: OBSTETRICS & GYNECOLOGY

## 2023-08-02 PROCEDURE — 86901 BLOOD TYPING SEROLOGIC RH(D): CPT | Performed by: OBSTETRICS & GYNECOLOGY

## 2023-08-02 PROCEDURE — 86803 HEPATITIS C AB TEST: CPT | Performed by: OBSTETRICS & GYNECOLOGY

## 2023-08-02 PROCEDURE — 36415 COLL VENOUS BLD VENIPUNCTURE: CPT | Performed by: OBSTETRICS & GYNECOLOGY

## 2023-08-02 PROCEDURE — 86850 RBC ANTIBODY SCREEN: CPT | Performed by: OBSTETRICS & GYNECOLOGY

## 2023-08-02 PROCEDURE — 86762 RUBELLA ANTIBODY: CPT | Performed by: OBSTETRICS & GYNECOLOGY

## 2023-08-02 PROCEDURE — 87086 URINE CULTURE/COLONY COUNT: CPT | Performed by: OBSTETRICS & GYNECOLOGY

## 2023-08-02 PROCEDURE — 87389 HIV-1 AG W/HIV-1&-2 AB AG IA: CPT | Performed by: OBSTETRICS & GYNECOLOGY

## 2023-08-02 PROCEDURE — 87340 HEPATITIS B SURFACE AG IA: CPT | Performed by: OBSTETRICS & GYNECOLOGY

## 2023-08-02 PROCEDURE — 86780 TREPONEMA PALLIDUM: CPT | Performed by: OBSTETRICS & GYNECOLOGY

## 2023-08-02 PROCEDURE — 86900 BLOOD TYPING SEROLOGIC ABO: CPT | Performed by: OBSTETRICS & GYNECOLOGY

## 2023-08-02 PROCEDURE — 99213 OFFICE O/P EST LOW 20 MIN: CPT | Performed by: OBSTETRICS & GYNECOLOGY

## 2023-08-04 LAB — BACTERIA UR CULT: NORMAL

## 2023-08-25 ENCOUNTER — PRENATAL OFFICE VISIT (OUTPATIENT)
Dept: OBGYN | Facility: CLINIC | Age: 28
End: 2023-08-25
Payer: COMMERCIAL

## 2023-08-25 VITALS — BODY MASS INDEX: 23.16 KG/M2 | WEIGHT: 158 LBS | SYSTOLIC BLOOD PRESSURE: 110 MMHG | DIASTOLIC BLOOD PRESSURE: 68 MMHG

## 2023-08-25 DIAGNOSIS — Z34.01 ENCOUNTER FOR SUPERVISION OF NORMAL FIRST PREGNANCY IN FIRST TRIMESTER: Primary | ICD-10-CM

## 2023-08-25 DIAGNOSIS — Z3A.12 12 WEEKS GESTATION OF PREGNANCY: ICD-10-CM

## 2023-08-25 PROCEDURE — 99207 PR PRENATAL VISIT: CPT | Performed by: OBSTETRICS & GYNECOLOGY

## 2023-08-25 NOTE — PROGRESS NOTES
Jenna Tripp is a 28 year old  at 12w2d    She had no complaints.  Denies LOF, VB, ctx. +FM.      ICD-10-CM    1. Encounter for supervision of normal first pregnancy in first trimester  Z34.01       2. 12 weeks gestation of pregnancy  Z3A.12           - First tri labs wnl.   - NIPT declined; AFP declined  - Level 2/Anatomy US pending on 10/18   - 3rd tri labs: due at 28 weeks  - GBS due at 36 weeks  - Reviewed Labor and PTL precautions, expected fetal movement and kick counts, PreE signs/symptoms  - TWlbs. Pregravid BMI 22 Expect 25-35 lbs.  - follow-up in 4 weeks

## 2023-09-26 ENCOUNTER — PRENATAL OFFICE VISIT (OUTPATIENT)
Dept: OBGYN | Facility: CLINIC | Age: 28
End: 2023-09-26
Payer: COMMERCIAL

## 2023-09-26 VITALS — WEIGHT: 159 LBS | BODY MASS INDEX: 23.31 KG/M2 | SYSTOLIC BLOOD PRESSURE: 118 MMHG | DIASTOLIC BLOOD PRESSURE: 70 MMHG

## 2023-09-26 DIAGNOSIS — Z34.01 ENCOUNTER FOR SUPERVISION OF NORMAL FIRST PREGNANCY IN FIRST TRIMESTER: Primary | ICD-10-CM

## 2023-09-26 DIAGNOSIS — Z3A.16 16 WEEKS GESTATION OF PREGNANCY: ICD-10-CM

## 2023-09-26 PROCEDURE — 99207 PR PRENATAL VISIT: CPT | Performed by: OBSTETRICS & GYNECOLOGY

## 2023-09-26 RX ORDER — POLYETHYLENE GLYCOL 3350 17 G/17G
1 POWDER, FOR SOLUTION ORAL DAILY
COMMUNITY

## 2023-09-26 NOTE — PROGRESS NOTES
Jenna Tripp is a 28 year old  at 16w6d    She is struggling with constipation.  Denies LOF, VB, ctx. +FM.      ICD-10-CM    1. Encounter for supervision of normal first pregnancy in first trimester  Z34.01       2. 16 weeks gestation of pregnancy  Z3A.16         - Constipation: Miralax, Metamucil, and for gas Simethicone  - First tri labs wnl.   - NIPT declined; AFP declined  - Level 2/Anatomy US pending on 10/18   - 3rd tri labs: due at 28 weeks  - GBS due at 36 weeks  - Reviewed Labor and PTL precautions, expected fetal movement and kick counts, PreE signs/symptoms  - TWlbs. Pregravid BMI 22 Expect 25-35 lbs.  - follow-up in 4 weeks

## 2023-10-10 DIAGNOSIS — Z34.01 ENCOUNTER FOR SUPERVISION OF NORMAL FIRST PREGNANCY IN FIRST TRIMESTER: Primary | ICD-10-CM

## 2023-10-18 ENCOUNTER — PRENATAL OFFICE VISIT (OUTPATIENT)
Dept: OBGYN | Facility: CLINIC | Age: 28
End: 2023-10-18
Attending: OBSTETRICS & GYNECOLOGY
Payer: COMMERCIAL

## 2023-10-18 ENCOUNTER — ANCILLARY PROCEDURE (OUTPATIENT)
Dept: ULTRASOUND IMAGING | Facility: CLINIC | Age: 28
End: 2023-10-18
Attending: OBSTETRICS & GYNECOLOGY
Payer: COMMERCIAL

## 2023-10-18 VITALS — BODY MASS INDEX: 24.04 KG/M2 | WEIGHT: 164 LBS | DIASTOLIC BLOOD PRESSURE: 60 MMHG | SYSTOLIC BLOOD PRESSURE: 110 MMHG

## 2023-10-18 DIAGNOSIS — Z34.01 ENCOUNTER FOR SUPERVISION OF NORMAL FIRST PREGNANCY IN FIRST TRIMESTER: ICD-10-CM

## 2023-10-18 DIAGNOSIS — Z3A.20 20 WEEKS GESTATION OF PREGNANCY: ICD-10-CM

## 2023-10-18 DIAGNOSIS — Z34.02 ENCOUNTER FOR SUPERVISION OF NORMAL FIRST PREGNANCY IN SECOND TRIMESTER: Primary | ICD-10-CM

## 2023-10-18 PROCEDURE — 76805 OB US >/= 14 WKS SNGL FETUS: CPT | Performed by: OBSTETRICS & GYNECOLOGY

## 2023-10-18 PROCEDURE — 99207 PR PRENATAL VISIT: CPT | Performed by: OBSTETRICS & GYNECOLOGY

## 2023-10-18 NOTE — PROGRESS NOTES
Jenna Tripp is a 28 year old  at 16w6d    Denies LOF, VB, ctx. +FM.      ICD-10-CM    1. Encounter for supervision of normal first pregnancy in second trimester  Z34.02       2. 20 weeks gestation of pregnancy  Z3A.20           - First tri labs wnl.   - NIPT declined; AFP declined  - Level 2/Anatomy US pending on 10/18   -  tri labs: due at 28 weeks  - GBS due at 36 weeks  - Planning flu vaccine at work  - Reviewed Labor and PTL precautions, expected fetal movement and kick counts, PreE signs/symptoms  - TWlbs. Pregravid BMI 22 Expect 25-35 lbs.  - follow-up in 4 weeks      Results for orders placed or performed in visit on 10/18/23   US OB > 14 Weeks     Status: None    Narrative    Table formatting from the original result was not included.     US OB > 14 Weeks  Order #: [id number] Accession #: [id number]  Study Notes     Brianna Gruber on 10/18/2023  3:12 PM      Obstetrical Ultrasound Report  OB U/S > 14 Weeks - Transabdominal  [facility]  Referring Provider: Dr. Adriana Nayak  Sonographer: Brianna Gruber RDMS  Indication:  Fetal Anatomy Survey     Dating (mm/dd/yyyy):   LMP: Patient's last menstrual period was 2023 (exact date).     EDC:    Estimated Date of Delivery: Mar 6, 2024          GA by LMP:        20w0d     Current Scan On:  10/18/2023       EDC:  24              GA by Current Scan:          20w1d  The calculation of the gestational age by current scan was based on BPD,   HC, AC and FL.  Anatomy Scan:  Preston gestation.  Biometry:  BPD 4.80 cm 20w4d   HC 17.52 cm 20w0d   AC 14.56 cm 19w6d   FL 3.21 cm 20w0d   Cerebellum 2.07 cm 19w5d   CM 2.82 mm     Lat Vent 6.10 mm     NF 3.45 mm     EFW (lbs/oz) 0 lbs               11ozs     EFW (g) 323 g        Fetal heart activity: Rate and rhythm is within normal limits. Fetal heart   rate: 138 bpm  Fetal presentation: Cephalic  Amniotic fluid: 3.88 cm MVP    Cord: 3 Vessel Cord  Placenta: Anterior , no previa,  > 2 cm from internal os  Fetal Anatomy:   Visualized with normal appearance: Lateral Ventricle, Choroid Plexus,   Cisterna Magna, Cerebellum, Midline Falx, Cavum Septum Pellucidum, Face,   Nose/Lips , Profile, 4 Chamber Heart, RVOT, LVOT, Spine, Kidneys, Stomach,   Diaphragm, Abdominal Cord Insertion, Bladder, Arms, Legs, and Gender: Not   disclosed  Not visualized on today s ultrasound: NA  Abnormal appearance: NA     Maternal Structures:  Cervix: The cervix appears long and closed.  Cervical Length: 3.90cm  Right Adnexa: wnl  Left Adnexa: wnl  Technique: Transabdominal Imaging performed  Impression:           Growth and anatomy survey appears normal.  Fetal anomalies may be present but not dectected.  EFW by today's ultrasound is 323 grams, which is the 43%tile.  Placental location is anterior and within normal limits.  No previa or low   lying placenta visualized.  Vertex presentation  3 Vessel cord    Adriana Nayak MD

## 2023-11-14 NOTE — PROGRESS NOTES
Jenna Tripp is a 28 year old  at 24w0d      Denies LOF, VB, ctx. +FM.      ICD-10-CM    1. Encounter for supervision of normal first pregnancy in second trimester  Z34.02       2. 24 weeks gestation of pregnancy  Z3A.24             - First tri labs wnl.   - NIPT declined; AFP declined  - Anatomy US completed on 10/18: Ant, 3 VC. EFW 43%tile  - 3rd tri labs: due at 28 weeks  - GBS due at 36 weeks  - Flu 10/30; Covid 11/15  - Plan tdap at 28 wks and RSV @ 34 wks  - Reviewed Labor and PTL precautions, expected fetal movement and kick counts, PreE signs/symptoms  - TWlbs. Pregravid BMI 22 Expect 25-35 lbs.  - follow-up in 4 weeks

## 2023-11-15 ENCOUNTER — PRENATAL OFFICE VISIT (OUTPATIENT)
Dept: OBGYN | Facility: CLINIC | Age: 28
End: 2023-11-15
Payer: COMMERCIAL

## 2023-11-15 VITALS — WEIGHT: 170 LBS | SYSTOLIC BLOOD PRESSURE: 104 MMHG | BODY MASS INDEX: 24.92 KG/M2 | DIASTOLIC BLOOD PRESSURE: 60 MMHG

## 2023-11-15 DIAGNOSIS — Z34.02 ENCOUNTER FOR SUPERVISION OF NORMAL FIRST PREGNANCY IN SECOND TRIMESTER: Primary | ICD-10-CM

## 2023-11-15 DIAGNOSIS — Z3A.24 24 WEEKS GESTATION OF PREGNANCY: ICD-10-CM

## 2023-11-15 DIAGNOSIS — Z23 HIGH PRIORITY FOR 2019-NCOV VACCINE: ICD-10-CM

## 2023-11-15 PROCEDURE — 90480 ADMN SARSCOV2 VAC 1/ONLY CMP: CPT | Performed by: OBSTETRICS & GYNECOLOGY

## 2023-11-15 PROCEDURE — 99207 PR PRENATAL VISIT: CPT | Performed by: OBSTETRICS & GYNECOLOGY

## 2023-11-15 PROCEDURE — 91320 SARSCV2 VAC 30MCG TRS-SUC IM: CPT | Performed by: OBSTETRICS & GYNECOLOGY

## 2023-12-01 DIAGNOSIS — Z36.9 ENCOUNTER FOR ANTENATAL SCREENING OF MOTHER: Primary | ICD-10-CM

## 2023-12-07 DIAGNOSIS — Z23 NEED FOR TDAP VACCINATION: Primary | ICD-10-CM

## 2023-12-12 ENCOUNTER — LAB (OUTPATIENT)
Dept: LAB | Facility: CLINIC | Age: 28
End: 2023-12-12
Payer: COMMERCIAL

## 2023-12-12 ENCOUNTER — PRENATAL OFFICE VISIT (OUTPATIENT)
Dept: OBGYN | Facility: CLINIC | Age: 28
End: 2023-12-12
Payer: COMMERCIAL

## 2023-12-12 VITALS
BODY MASS INDEX: 24.82 KG/M2 | DIASTOLIC BLOOD PRESSURE: 60 MMHG | HEIGHT: 70 IN | WEIGHT: 173.4 LBS | SYSTOLIC BLOOD PRESSURE: 110 MMHG

## 2023-12-12 DIAGNOSIS — Z34.03 ENCOUNTER FOR SUPERVISION OF NORMAL FIRST PREGNANCY IN THIRD TRIMESTER: Primary | ICD-10-CM

## 2023-12-12 DIAGNOSIS — Z3A.27 27 WEEKS GESTATION OF PREGNANCY: ICD-10-CM

## 2023-12-12 DIAGNOSIS — Z23 NEED FOR TDAP VACCINATION: ICD-10-CM

## 2023-12-12 DIAGNOSIS — Z36.9 ENCOUNTER FOR ANTENATAL SCREENING OF MOTHER: ICD-10-CM

## 2023-12-12 LAB
ERYTHROCYTE [DISTWIDTH] IN BLOOD BY AUTOMATED COUNT: 13.1 % (ref 10–15)
GLUCOSE 1H P 50 G GLC PO SERPL-MCNC: 134 MG/DL (ref 70–129)
HCT VFR BLD AUTO: 30.6 % (ref 35–47)
HGB BLD-MCNC: 10.8 G/DL (ref 11.7–15.7)
MCH RBC QN AUTO: 34.8 PG (ref 26.5–33)
MCHC RBC AUTO-ENTMCNC: 35.3 G/DL (ref 31.5–36.5)
MCV RBC AUTO: 99 FL (ref 78–100)
PLATELET # BLD AUTO: 157 10E3/UL (ref 150–450)
RBC # BLD AUTO: 3.1 10E6/UL (ref 3.8–5.2)
WBC # BLD AUTO: 8 10E3/UL (ref 4–11)

## 2023-12-12 PROCEDURE — 36415 COLL VENOUS BLD VENIPUNCTURE: CPT

## 2023-12-12 PROCEDURE — 82950 GLUCOSE TEST: CPT

## 2023-12-12 PROCEDURE — 90471 IMMUNIZATION ADMIN: CPT | Performed by: OBSTETRICS & GYNECOLOGY

## 2023-12-12 PROCEDURE — 85027 COMPLETE CBC AUTOMATED: CPT

## 2023-12-12 PROCEDURE — 90715 TDAP VACCINE 7 YRS/> IM: CPT | Performed by: OBSTETRICS & GYNECOLOGY

## 2023-12-12 PROCEDURE — 99207 PR PRENATAL VISIT: CPT | Performed by: OBSTETRICS & GYNECOLOGY

## 2023-12-12 NOTE — PROGRESS NOTES
Jenna Tripp is a 28 year old  at 27w6d    Denies LOF, VB, ctx. +FM.      ICD-10-CM    1. Encounter for supervision of normal first pregnancy in third trimester  Z34.03       2. 27 weeks gestation of pregnancy  Z3A.27       3. Need for Tdap vaccination  Z23 TDAP 7+ (ADACEL,BOOSTRIX)          - First tri labs wnl.   - NIPT declined; AFP declined  - Anatomy US completed on 10/18: Ant, 3 VC. EFW 43%tile  - 3rd tri labs:   - GBS due at 36 weeks  - Flu 10/30; Covid 11/15  - Plan tdap  and RSV @ 34 wks  - Reviewed Labor and PTL precautions, expected fetal movement and kick counts, PreE signs/symptoms  - TW lbs. Pregravid BMI 22 Expect 25-35 lbs.  - follow-up in 4 weeks

## 2023-12-14 DIAGNOSIS — R73.02 IMPAIRED GLUCOSE TOLERANCE: Primary | ICD-10-CM

## 2023-12-19 ENCOUNTER — LAB (OUTPATIENT)
Dept: LAB | Facility: CLINIC | Age: 28
End: 2023-12-19
Payer: COMMERCIAL

## 2023-12-19 DIAGNOSIS — R73.02 IMPAIRED GLUCOSE TOLERANCE: ICD-10-CM

## 2023-12-19 LAB
GESTATIONAL GTT 1 HR POST DOSE: 125 MG/DL (ref 60–179)
GESTATIONAL GTT 2 HR POST DOSE: 98 MG/DL (ref 60–154)
GLUCOSE P FAST SERPL-MCNC: 76 MG/DL (ref 60–94)

## 2023-12-19 PROCEDURE — 36415 COLL VENOUS BLD VENIPUNCTURE: CPT

## 2023-12-19 PROCEDURE — 82951 GLUCOSE TOLERANCE TEST (GTT): CPT

## 2023-12-19 PROCEDURE — 82952 GTT-ADDED SAMPLES: CPT

## 2023-12-20 LAB — GESTATIONAL GTT 3 HR POST DOSE: 101 MG/DL (ref 60–139)

## 2023-12-27 ENCOUNTER — PRENATAL OFFICE VISIT (OUTPATIENT)
Dept: OBGYN | Facility: CLINIC | Age: 28
End: 2023-12-27
Payer: COMMERCIAL

## 2023-12-27 VITALS
HEIGHT: 70 IN | BODY MASS INDEX: 25.17 KG/M2 | SYSTOLIC BLOOD PRESSURE: 112 MMHG | DIASTOLIC BLOOD PRESSURE: 68 MMHG | WEIGHT: 175.8 LBS

## 2023-12-27 DIAGNOSIS — O99.013 ANEMIA DURING PREGNANCY IN THIRD TRIMESTER: ICD-10-CM

## 2023-12-27 DIAGNOSIS — Z34.03 ENCOUNTER FOR SUPERVISION OF NORMAL FIRST PREGNANCY IN THIRD TRIMESTER: Primary | ICD-10-CM

## 2023-12-27 DIAGNOSIS — Z3A.30 30 WEEKS GESTATION OF PREGNANCY: ICD-10-CM

## 2023-12-27 PROCEDURE — 99207 PR PRENATAL VISIT: CPT | Performed by: OBSTETRICS & GYNECOLOGY

## 2023-12-27 NOTE — PROGRESS NOTES
Jenna Tripp is a 28 year old  at 30w0d    Denies LOF, VB, ctx. +FM.      ICD-10-CM    1. Encounter for supervision of normal first pregnancy in third trimester  Z34.03       2. Anemia during pregnancy in third trimester  O99.013       3. 30 weeks gestation of pregnancy  Z3A.30             - First tri labs wnl.   - NIPT declined; AFP declined  - Anatomy US completed on 10/18: Ant, 3 VC. EFW 43%tile  - 3rd tri labs: elevated 1 hr. Hgb 10.8   -Normal 3 hr gtt   -Anemia: start iron: recheck @ 36 weeks  - GBS due at 36 weeks  - Flu 10/30; Covid 11/15; Tdap   - RSV @ 34 wks  - Reviewed Labor and PTL precautions, expected fetal movement and kick counts, PreE signs/symptoms  - TW lbs. Pregravid BMI 22 Expect 25-35 lbs.  - follow-up in 2 weeks

## 2024-01-11 ENCOUNTER — PRENATAL OFFICE VISIT (OUTPATIENT)
Dept: OBGYN | Facility: CLINIC | Age: 29
End: 2024-01-11
Payer: COMMERCIAL

## 2024-01-11 VITALS
WEIGHT: 178.6 LBS | DIASTOLIC BLOOD PRESSURE: 70 MMHG | HEIGHT: 70 IN | SYSTOLIC BLOOD PRESSURE: 116 MMHG | BODY MASS INDEX: 25.57 KG/M2

## 2024-01-11 DIAGNOSIS — Z34.03 ENCOUNTER FOR SUPERVISION OF NORMAL FIRST PREGNANCY IN THIRD TRIMESTER: Primary | ICD-10-CM

## 2024-01-11 DIAGNOSIS — Z3A.32 32 WEEKS GESTATION OF PREGNANCY: ICD-10-CM

## 2024-01-11 PROCEDURE — 99207 PR PRENATAL VISIT: CPT | Performed by: OBSTETRICS & GYNECOLOGY

## 2024-01-11 NOTE — PROGRESS NOTES
Jenna Tripp is a 28 year old  at 32w1d     Denies LOF, VB, ctx. +FM.      ICD-10-CM    1. Encounter for supervision of normal first pregnancy in third trimester  Z34.03       2. 32 weeks gestation of pregnancy  Z3A.32         - First tri labs wnl.   - NIPT declined; AFP declined  - Anatomy US completed on 10/18: Ant, 3 VC. EFW 43%tile  - 3rd tri labs: elevated 1 hr. Hgb 10.8   -Normal 3 hr gtt   -Anemia: start iron: recheck @ 36 weeks  - GBS due at 36 weeks  - Flu 10/30; Covid 11/15; Tdap   - RSV @ 34 wks  - Reviewed Labor and PTL precautions, expected fetal movement and kick counts, PreE signs/symptoms  - TW lbs. Pregravid BMI 22 Expect 25-35 lbs.  - follow-up in 2 weeks

## 2024-01-24 ENCOUNTER — PRENATAL OFFICE VISIT (OUTPATIENT)
Dept: OBGYN | Facility: CLINIC | Age: 29
End: 2024-01-24
Payer: COMMERCIAL

## 2024-01-24 VITALS
WEIGHT: 183 LBS | SYSTOLIC BLOOD PRESSURE: 113 MMHG | DIASTOLIC BLOOD PRESSURE: 64 MMHG | HEIGHT: 70 IN | BODY MASS INDEX: 26.2 KG/M2

## 2024-01-24 DIAGNOSIS — Z29.11 NEED FOR RSV IMMUNIZATION: ICD-10-CM

## 2024-01-24 DIAGNOSIS — Z3A.34 34 WEEKS GESTATION OF PREGNANCY: ICD-10-CM

## 2024-01-24 DIAGNOSIS — O99.013 ANEMIA DURING PREGNANCY IN THIRD TRIMESTER: ICD-10-CM

## 2024-01-24 DIAGNOSIS — Z34.03 ENCOUNTER FOR SUPERVISION OF NORMAL FIRST PREGNANCY IN THIRD TRIMESTER: Primary | ICD-10-CM

## 2024-01-24 PROCEDURE — 99207 PR PRENATAL VISIT: CPT | Performed by: OBSTETRICS & GYNECOLOGY

## 2024-01-24 PROCEDURE — 90678 RSV VACC PREF BIVALENT IM: CPT | Performed by: OBSTETRICS & GYNECOLOGY

## 2024-01-24 PROCEDURE — 90471 IMMUNIZATION ADMIN: CPT | Performed by: OBSTETRICS & GYNECOLOGY

## 2024-01-24 ASSESSMENT — ANXIETY QUESTIONNAIRES
IF YOU CHECKED OFF ANY PROBLEMS ON THIS QUESTIONNAIRE, HOW DIFFICULT HAVE THESE PROBLEMS MADE IT FOR YOU TO DO YOUR WORK, TAKE CARE OF THINGS AT HOME, OR GET ALONG WITH OTHER PEOPLE: NOT DIFFICULT AT ALL
1. FEELING NERVOUS, ANXIOUS, OR ON EDGE: NOT AT ALL
5. BEING SO RESTLESS THAT IT IS HARD TO SIT STILL: NOT AT ALL
7. FEELING AFRAID AS IF SOMETHING AWFUL MIGHT HAPPEN: NOT AT ALL
GAD7 TOTAL SCORE: 0
6. BECOMING EASILY ANNOYED OR IRRITABLE: NOT AT ALL
3. WORRYING TOO MUCH ABOUT DIFFERENT THINGS: NOT AT ALL
GAD7 TOTAL SCORE: 0
2. NOT BEING ABLE TO STOP OR CONTROL WORRYING: NOT AT ALL

## 2024-01-24 ASSESSMENT — PATIENT HEALTH QUESTIONNAIRE - PHQ9
5. POOR APPETITE OR OVEREATING: NOT AT ALL
SUM OF ALL RESPONSES TO PHQ QUESTIONS 1-9: 0

## 2024-01-24 NOTE — PROGRESS NOTES
Jenna Tripp is a 28 year old  at 34w0d     Denies LOF, VB, ctx. +FM.      ICD-10-CM    1. Encounter for supervision of normal first pregnancy in third trimester  Z34.03       2. Need for RSV immunization  Z29.11 RSV VACCINE (ABRYSVO)      3. 34 weeks gestation of pregnancy  Z3A.34       4. Anemia during pregnancy in third trimester  O99.013 Hemoglobin        - First tri labs wnl.   - NIPT declined; AFP declined  - Anatomy US completed on 10/18: Ant, 3 VC. EFW 43%tile  - 3rd tri labs: elevated 1 hr. Hgb 10.8   -Normal 3 hr gtt   -Anemia: start iron: recheck @ 36 weeks, order placed today  - GBS due at 36 weeks  - Flu 10/30; Covid 11/15; Tdap   - RSV 24  - Reviewed Labor and PTL precautions, expected fetal movement and kick counts, PreE signs/symptoms  - TW lbs. Pregravid BMI 22 Expect 25-35 lbs.  - follow-up in 2 weeks

## 2024-01-24 NOTE — NURSING NOTE
Prior to immunization administration, verified patients identity using patient s name and date of birth. Please see Immunization Activity for additional information.     Screening Questionnaire for Adult Immunization    Are you sick today?   No   Do you have allergies to medications, food, a vaccine component or latex?   No   Have you ever had a serious reaction after receiving a vaccination?   No   Do you have a long-term health problem with heart, lung, kidney, or metabolic disease (e.g., diabetes), asthma, a blood disorder, no spleen, complement component deficiency, a cochlear implant, or a spinal fluid leak?  Are you on long-term aspirin therapy?   No   Do you have cancer, leukemia, HIV/AIDS, or any other immune system problem?   No   Do you have a parent, brother, or sister with an immune system problem?   No   In the past 3 months, have you taken medications that affect  your immune system, such as prednisone, other steroids, or anticancer drugs; drugs for the treatment of rheumatoid arthritis, Crohn s disease, or psoriasis; or have you had radiation treatments?   No   Have you had a seizure, or a brain or other nervous system problem?   No   During the past year, have you received a transfusion of blood or blood    products, or been given immune (gamma) globulin or antiviral drug?   No   For women: Are you pregnant or is there a chance you could become       pregnant during the next month?   Yes   Have you received any vaccinations in the past 4 weeks?   No     Immunization questionnaire was positive for at least one answer.  Patient MEETS CRITERIA FOR RSV IMMUNIZATION FOR PRENATAL PATIENTS 32w-36w.      Patient instructed to remain in clinic for 15 minutes afterwards, and to report any adverse reactions.     Screening performed by Ama Ohara MA on 1/24/2024 at 3:00 PM.

## 2024-01-31 ENCOUNTER — MYC MEDICAL ADVICE (OUTPATIENT)
Dept: OBGYN | Facility: CLINIC | Age: 29
End: 2024-01-31
Payer: COMMERCIAL

## 2024-02-07 ENCOUNTER — PRENATAL OFFICE VISIT (OUTPATIENT)
Dept: OBGYN | Facility: CLINIC | Age: 29
End: 2024-02-07
Payer: COMMERCIAL

## 2024-02-07 VITALS
BODY MASS INDEX: 27.11 KG/M2 | SYSTOLIC BLOOD PRESSURE: 117 MMHG | HEIGHT: 70 IN | WEIGHT: 189.4 LBS | DIASTOLIC BLOOD PRESSURE: 74 MMHG

## 2024-02-07 DIAGNOSIS — Z3A.36 36 WEEKS GESTATION OF PREGNANCY: ICD-10-CM

## 2024-02-07 DIAGNOSIS — Z36.85 SCREENING, ANTENATAL, FOR STREPTOCOCCUS B: ICD-10-CM

## 2024-02-07 DIAGNOSIS — Z34.03 ENCOUNTER FOR SUPERVISION OF NORMAL FIRST PREGNANCY IN THIRD TRIMESTER: Primary | ICD-10-CM

## 2024-02-07 DIAGNOSIS — O99.013 ANEMIA DURING PREGNANCY IN THIRD TRIMESTER: ICD-10-CM

## 2024-02-07 LAB — HGB BLD-MCNC: 12 G/DL (ref 11.7–15.7)

## 2024-02-07 PROCEDURE — 87653 STREP B DNA AMP PROBE: CPT | Performed by: OBSTETRICS & GYNECOLOGY

## 2024-02-07 PROCEDURE — 36415 COLL VENOUS BLD VENIPUNCTURE: CPT | Performed by: OBSTETRICS & GYNECOLOGY

## 2024-02-07 PROCEDURE — 99207 PR PRENATAL VISIT: CPT | Performed by: OBSTETRICS & GYNECOLOGY

## 2024-02-07 PROCEDURE — 85018 HEMOGLOBIN: CPT | Performed by: OBSTETRICS & GYNECOLOGY

## 2024-02-07 NOTE — PROGRESS NOTES
Jenna Tripp is a 28 year old  at 36w0d     No concerns today. Shares she has had some increased swelling in her legs today, but no increased vaginal discharge, pain, or headaches.   Denies LOF, VB, ctx. +FM.      ICD-10-CM    1. Encounter for supervision of normal first pregnancy in third trimester  Z34.03       2. Screening, , for Streptococcus B  Z36.85 Group B strep PCR      3. Anemia during pregnancy in third trimester  O99.013       4. 36 weeks gestation of pregnancy  Z3A.36           - First tri labs wnl.   - NIPT declined; AFP declined  - Anatomy US completed on 10/18: Ant, 3 VC. EFW 43%tile  - 3rd tri labs: elevated 1 hr. Hgb 10.8   -Normal 3 hr gtt   -Anemia: start iron: recheck @ 36 weeks, plan today  - GBS today  - Flu 10/30; Covid 11/15; Tdap   - RSV 24  - Reviewed Labor and PTL precautions, expected fetal movement and kick counts, PreE signs/symptoms  - TW lbs. Pregravid BMI 22 Expect 25-35 lbs.  - follow-up weekly

## 2024-02-09 LAB — GP B STREP DNA SPEC QL NAA+PROBE: NEGATIVE

## 2024-02-16 ENCOUNTER — PRENATAL OFFICE VISIT (OUTPATIENT)
Dept: OBGYN | Facility: CLINIC | Age: 29
End: 2024-02-16
Payer: COMMERCIAL

## 2024-02-16 VITALS — WEIGHT: 194.4 LBS | DIASTOLIC BLOOD PRESSURE: 78 MMHG | BODY MASS INDEX: 28.3 KG/M2 | SYSTOLIC BLOOD PRESSURE: 120 MMHG

## 2024-02-16 DIAGNOSIS — Z3A.37 37 WEEKS GESTATION OF PREGNANCY: ICD-10-CM

## 2024-02-16 DIAGNOSIS — Z34.03 ENCOUNTER FOR SUPERVISION OF NORMAL FIRST PREGNANCY IN THIRD TRIMESTER: Primary | ICD-10-CM

## 2024-02-16 PROCEDURE — 99207 PR PRENATAL VISIT: CPT | Performed by: OBSTETRICS & GYNECOLOGY

## 2024-02-16 NOTE — PROGRESS NOTES
Jenna Tripp is a 28 year old  at 37w2d       No concerns today. Shares she has had some increased swelling in her legs today, but no increased vaginal discharge, pain, or headaches.     Fluid retention, right worse than left. Not warm to touch or painful.  Was sitting a lot yesterday.     Denies LOF, VB, ctx. +FM.      ICD-10-CM    1. Encounter for supervision of normal first pregnancy in third trimester  Z34.03       2. 37 weeks gestation of pregnancy  Z3A.37         - First tri labs wnl.   - NIPT declined; AFP declined  - Anatomy US completed on 10/18: Ant, 3 VC. EFW 43%tile  - 3rd tri labs: elevated 1 hr. Hgb 10.8   -Normal 3 hr gtt   -Anemia: start iron: recheck @ 36 weeks: 12.0  - GBS negative 2024  - Flu 10/30; Covid 11/15; Tdap   - RSV 24  - Reviewed Labor and PTL precautions, expected fetal movement and kick counts, PreE signs/symptoms  - TW lbs. Pregravid BMI 22 Expect 25-35 lbs.  - follow-up weekly

## 2024-02-21 ENCOUNTER — PRENATAL OFFICE VISIT (OUTPATIENT)
Dept: OBGYN | Facility: CLINIC | Age: 29
End: 2024-02-21
Payer: COMMERCIAL

## 2024-02-21 VITALS
BODY MASS INDEX: 28.32 KG/M2 | WEIGHT: 197.8 LBS | SYSTOLIC BLOOD PRESSURE: 114 MMHG | HEIGHT: 70 IN | DIASTOLIC BLOOD PRESSURE: 60 MMHG

## 2024-02-21 DIAGNOSIS — Z3A.38 38 WEEKS GESTATION OF PREGNANCY: ICD-10-CM

## 2024-02-21 DIAGNOSIS — Z34.03 ENCOUNTER FOR SUPERVISION OF NORMAL FIRST PREGNANCY IN THIRD TRIMESTER: Primary | ICD-10-CM

## 2024-02-21 PROCEDURE — 99207 PR PRENATAL VISIT: CPT | Performed by: OBSTETRICS & GYNECOLOGY

## 2024-02-21 NOTE — PROGRESS NOTES
Jenna Tripp is a 28 year old  at 38w0d    No concerns today. Still has some BLE swelling.  But not change since last visit.  Denies LOF, VB, ctx. +FM.      ICD-10-CM    1. Encounter for supervision of normal first pregnancy in third trimester  Z34.03       2. 38 weeks gestation of pregnancy  Z3A.38           - First tri labs wnl.   - NIPT declined; AFP declined  - Anatomy US completed on 10/18: Ant, 3 VC. EFW 43%tile  - 3rd tri labs: elevated 1 hr. Hgb 10.8   -Normal 3 hr gtt   -Anemia: start iron: recheck @ 36 weeks: 12.0  - GBS negative 2024  - Flu 10/30; Covid 11/15; Tdap   - RSV 24  - Reviewed Labor and PTL precautions, expected fetal movement and kick counts, PreE signs/symptoms  - Cervix check today, /-2/mid  - TW lbs. Pregravid BMI 22 Expect 25-35 lbs.  - follow-up weekly

## 2024-02-24 ENCOUNTER — NURSE TRIAGE (OUTPATIENT)
Dept: NURSING | Facility: CLINIC | Age: 29
End: 2024-02-24
Payer: COMMERCIAL

## 2024-02-25 ENCOUNTER — HOSPITAL ENCOUNTER (INPATIENT)
Facility: CLINIC | Age: 29
LOS: 2 days | Discharge: HOME | End: 2024-02-27
Attending: OBSTETRICS & GYNECOLOGY | Admitting: OBSTETRICS & GYNECOLOGY
Payer: COMMERCIAL

## 2024-02-25 ENCOUNTER — ANESTHESIA EVENT (OUTPATIENT)
Dept: OBGYN | Facility: CLINIC | Age: 29
End: 2024-02-25
Payer: COMMERCIAL

## 2024-02-25 ENCOUNTER — ANESTHESIA (OUTPATIENT)
Dept: OBGYN | Facility: CLINIC | Age: 29
End: 2024-02-25
Payer: COMMERCIAL

## 2024-02-25 PROBLEM — Z3A.09 9 WEEKS GESTATION OF PREGNANCY: Status: RESOLVED | Noted: 2023-08-02 | Resolved: 2024-02-25

## 2024-02-25 PROBLEM — Z36.89 ENCOUNTER FOR TRIAGE IN PREGNANT PATIENT: Status: ACTIVE | Noted: 2024-02-25

## 2024-02-25 LAB
ABO/RH(D): NORMAL
ANTIBODY SCREEN: NEGATIVE
BASOPHILS # BLD AUTO: 0 10E3/UL (ref 0–0.2)
BASOPHILS NFR BLD AUTO: 0 %
EOSINOPHIL # BLD AUTO: 0 10E3/UL (ref 0–0.7)
EOSINOPHIL NFR BLD AUTO: 0 %
ERYTHROCYTE [DISTWIDTH] IN BLOOD BY AUTOMATED COUNT: 12.5 % (ref 10–15)
HCT VFR BLD AUTO: 34.1 % (ref 35–47)
HGB BLD-MCNC: 11.8 G/DL (ref 11.7–15.7)
IMM GRANULOCYTES # BLD: 0 10E3/UL
IMM GRANULOCYTES NFR BLD: 1 %
LYMPHOCYTES # BLD AUTO: 1.9 10E3/UL (ref 0.8–5.3)
LYMPHOCYTES NFR BLD AUTO: 24 %
MCH RBC QN AUTO: 34.3 PG (ref 26.5–33)
MCHC RBC AUTO-ENTMCNC: 34.6 G/DL (ref 31.5–36.5)
MCV RBC AUTO: 99 FL (ref 78–100)
MONOCYTES # BLD AUTO: 0.3 10E3/UL (ref 0–1.3)
MONOCYTES NFR BLD AUTO: 4 %
NEUTROPHILS # BLD AUTO: 5.7 10E3/UL (ref 1.6–8.3)
NEUTROPHILS NFR BLD AUTO: 71 %
NRBC # BLD AUTO: 0 10E3/UL
NRBC BLD AUTO-RTO: 0 /100
PLATELET # BLD AUTO: 142 10E3/UL (ref 150–450)
RBC # BLD AUTO: 3.44 10E6/UL (ref 3.8–5.2)
SPECIMEN EXPIRATION DATE: NORMAL
T PALLIDUM AB SER QL: NONREACTIVE
WBC # BLD AUTO: 8.1 10E3/UL (ref 4–11)

## 2024-02-25 PROCEDURE — 10907ZC DRAINAGE OF AMNIOTIC FLUID, THERAPEUTIC FROM PRODUCTS OF CONCEPTION, VIA NATURAL OR ARTIFICIAL OPENING: ICD-10-PCS | Performed by: OBSTETRICS & GYNECOLOGY

## 2024-02-25 PROCEDURE — 250N000011 HC RX IP 250 OP 636: Performed by: STUDENT IN AN ORGANIZED HEALTH CARE EDUCATION/TRAINING PROGRAM

## 2024-02-25 PROCEDURE — 722N000001 HC LABOR CARE VAGINAL DELIVERY SINGLE

## 2024-02-25 PROCEDURE — 59400 OBSTETRICAL CARE: CPT | Performed by: OBSTETRICS & GYNECOLOGY

## 2024-02-25 PROCEDURE — 59400 OBSTETRICAL CARE: CPT | Performed by: STUDENT IN AN ORGANIZED HEALTH CARE EDUCATION/TRAINING PROGRAM

## 2024-02-25 PROCEDURE — 250N000009 HC RX 250: Performed by: OBSTETRICS & GYNECOLOGY

## 2024-02-25 PROCEDURE — 258N000003 HC RX IP 258 OP 636: Performed by: OBSTETRICS & GYNECOLOGY

## 2024-02-25 PROCEDURE — G0463 HOSPITAL OUTPT CLINIC VISIT: HCPCS

## 2024-02-25 PROCEDURE — 250N000013 HC RX MED GY IP 250 OP 250 PS 637: Performed by: OBSTETRICS & GYNECOLOGY

## 2024-02-25 PROCEDURE — 3E0R3BZ INTRODUCTION OF ANESTHETIC AGENT INTO SPINAL CANAL, PERCUTANEOUS APPROACH: ICD-10-PCS | Performed by: STUDENT IN AN ORGANIZED HEALTH CARE EDUCATION/TRAINING PROGRAM

## 2024-02-25 PROCEDURE — 120N000012 HC R&B POSTPARTUM

## 2024-02-25 PROCEDURE — 86900 BLOOD TYPING SEROLOGIC ABO: CPT | Performed by: OBSTETRICS & GYNECOLOGY

## 2024-02-25 PROCEDURE — 00HU33Z INSERTION OF INFUSION DEVICE INTO SPINAL CANAL, PERCUTANEOUS APPROACH: ICD-10-PCS | Performed by: STUDENT IN AN ORGANIZED HEALTH CARE EDUCATION/TRAINING PROGRAM

## 2024-02-25 PROCEDURE — 0UQMXZZ REPAIR VULVA, EXTERNAL APPROACH: ICD-10-PCS | Performed by: OBSTETRICS & GYNECOLOGY

## 2024-02-25 PROCEDURE — 85025 COMPLETE CBC W/AUTO DIFF WBC: CPT | Performed by: OBSTETRICS & GYNECOLOGY

## 2024-02-25 PROCEDURE — 36415 COLL VENOUS BLD VENIPUNCTURE: CPT | Performed by: OBSTETRICS & GYNECOLOGY

## 2024-02-25 PROCEDURE — 370N000003 HC ANESTHESIA WARD SERVICE: Performed by: STUDENT IN AN ORGANIZED HEALTH CARE EDUCATION/TRAINING PROGRAM

## 2024-02-25 PROCEDURE — 86780 TREPONEMA PALLIDUM: CPT | Performed by: OBSTETRICS & GYNECOLOGY

## 2024-02-25 PROCEDURE — 250N000011 HC RX IP 250 OP 636: Performed by: OBSTETRICS & GYNECOLOGY

## 2024-02-25 RX ORDER — NALBUPHINE HYDROCHLORIDE 20 MG/ML
2.5-5 INJECTION, SOLUTION INTRAMUSCULAR; INTRAVENOUS; SUBCUTANEOUS EVERY 6 HOURS PRN
Status: DISCONTINUED | OUTPATIENT
Start: 2024-02-25 | End: 2024-02-27 | Stop reason: HOSPADM

## 2024-02-25 RX ORDER — LIDOCAINE 40 MG/G
CREAM TOPICAL
Status: DISCONTINUED | OUTPATIENT
Start: 2024-02-25 | End: 2024-02-25 | Stop reason: HOSPADM

## 2024-02-25 RX ORDER — CARBOPROST TROMETHAMINE 250 UG/ML
250 INJECTION, SOLUTION INTRAMUSCULAR
Status: DISCONTINUED | OUTPATIENT
Start: 2024-02-25 | End: 2024-02-25 | Stop reason: HOSPADM

## 2024-02-25 RX ORDER — ACETAMINOPHEN 325 MG/1
650 TABLET ORAL EVERY 4 HOURS PRN
Status: DISCONTINUED | OUTPATIENT
Start: 2024-02-25 | End: 2024-02-27 | Stop reason: HOSPADM

## 2024-02-25 RX ORDER — ONDANSETRON 4 MG/1
4 TABLET, ORALLY DISINTEGRATING ORAL EVERY 6 HOURS PRN
Status: DISCONTINUED | OUTPATIENT
Start: 2024-02-25 | End: 2024-02-25 | Stop reason: HOSPADM

## 2024-02-25 RX ORDER — TRANEXAMIC ACID 10 MG/ML
1 INJECTION, SOLUTION INTRAVENOUS EVERY 30 MIN PRN
Status: DISCONTINUED | OUTPATIENT
Start: 2024-02-25 | End: 2024-02-25 | Stop reason: HOSPADM

## 2024-02-25 RX ORDER — ONDANSETRON 2 MG/ML
4 INJECTION INTRAMUSCULAR; INTRAVENOUS EVERY 6 HOURS PRN
Status: DISCONTINUED | OUTPATIENT
Start: 2024-02-25 | End: 2024-02-25 | Stop reason: HOSPADM

## 2024-02-25 RX ORDER — OXYTOCIN/0.9 % SODIUM CHLORIDE 30/500 ML
340 PLASTIC BAG, INJECTION (ML) INTRAVENOUS CONTINUOUS PRN
Status: DISCONTINUED | OUTPATIENT
Start: 2024-02-25 | End: 2024-02-27 | Stop reason: HOSPADM

## 2024-02-25 RX ORDER — KETOROLAC TROMETHAMINE 30 MG/ML
30 INJECTION, SOLUTION INTRAMUSCULAR; INTRAVENOUS
Status: DISCONTINUED | OUTPATIENT
Start: 2024-02-25 | End: 2024-02-26

## 2024-02-25 RX ORDER — BISACODYL 10 MG
10 SUPPOSITORY, RECTAL RECTAL DAILY PRN
Status: DISCONTINUED | OUTPATIENT
Start: 2024-02-25 | End: 2024-02-27 | Stop reason: HOSPADM

## 2024-02-25 RX ORDER — OXYTOCIN/0.9 % SODIUM CHLORIDE 30/500 ML
340 PLASTIC BAG, INJECTION (ML) INTRAVENOUS CONTINUOUS PRN
Status: DISCONTINUED | OUTPATIENT
Start: 2024-02-25 | End: 2024-02-25 | Stop reason: HOSPADM

## 2024-02-25 RX ORDER — PROCHLORPERAZINE MALEATE 5 MG
10 TABLET ORAL EVERY 6 HOURS PRN
Status: DISCONTINUED | OUTPATIENT
Start: 2024-02-25 | End: 2024-02-25 | Stop reason: HOSPADM

## 2024-02-25 RX ORDER — PROCHLORPERAZINE 25 MG
25 SUPPOSITORY, RECTAL RECTAL EVERY 12 HOURS PRN
Status: DISCONTINUED | OUTPATIENT
Start: 2024-02-25 | End: 2024-02-25 | Stop reason: HOSPADM

## 2024-02-25 RX ORDER — CITRIC ACID/SODIUM CITRATE 334-500MG
30 SOLUTION, ORAL ORAL ONCE
Status: COMPLETED | OUTPATIENT
Start: 2024-02-25 | End: 2024-02-25

## 2024-02-25 RX ORDER — OXYTOCIN/0.9 % SODIUM CHLORIDE 30/500 ML
100-340 PLASTIC BAG, INJECTION (ML) INTRAVENOUS CONTINUOUS PRN
Status: DISCONTINUED | OUTPATIENT
Start: 2024-02-25 | End: 2024-02-26

## 2024-02-25 RX ORDER — ACETAMINOPHEN 325 MG/1
650 TABLET ORAL EVERY 4 HOURS PRN
Status: DISCONTINUED | OUTPATIENT
Start: 2024-02-25 | End: 2024-02-25 | Stop reason: HOSPADM

## 2024-02-25 RX ORDER — MODIFIED LANOLIN
OINTMENT (GRAM) TOPICAL
Status: DISCONTINUED | OUTPATIENT
Start: 2024-02-25 | End: 2024-02-27 | Stop reason: HOSPADM

## 2024-02-25 RX ORDER — FENTANYL CITRATE-0.9 % NACL/PF 10 MCG/ML
100 PLASTIC BAG, INJECTION (ML) INTRAVENOUS EVERY 5 MIN PRN
Status: DISCONTINUED | OUTPATIENT
Start: 2024-02-25 | End: 2024-02-25 | Stop reason: HOSPADM

## 2024-02-25 RX ORDER — OXYTOCIN/0.9 % SODIUM CHLORIDE 30/500 ML
1-24 PLASTIC BAG, INJECTION (ML) INTRAVENOUS CONTINUOUS
Status: DISCONTINUED | OUTPATIENT
Start: 2024-02-25 | End: 2024-02-25 | Stop reason: HOSPADM

## 2024-02-25 RX ORDER — LOPERAMIDE HCL 2 MG
4 CAPSULE ORAL
Status: DISCONTINUED | OUTPATIENT
Start: 2024-02-25 | End: 2024-02-25 | Stop reason: HOSPADM

## 2024-02-25 RX ORDER — MISOPROSTOL 200 UG/1
400 TABLET ORAL
Status: DISCONTINUED | OUTPATIENT
Start: 2024-02-25 | End: 2024-02-25 | Stop reason: HOSPADM

## 2024-02-25 RX ORDER — CARBOPROST TROMETHAMINE 250 UG/ML
250 INJECTION, SOLUTION INTRAMUSCULAR
Status: DISCONTINUED | OUTPATIENT
Start: 2024-02-25 | End: 2024-02-27 | Stop reason: HOSPADM

## 2024-02-25 RX ORDER — METHYLERGONOVINE MALEATE 0.2 MG/ML
200 INJECTION INTRAVENOUS
Status: DISCONTINUED | OUTPATIENT
Start: 2024-02-25 | End: 2024-02-27 | Stop reason: HOSPADM

## 2024-02-25 RX ORDER — METHYLERGONOVINE MALEATE 0.2 MG/ML
200 INJECTION INTRAVENOUS
Status: DISCONTINUED | OUTPATIENT
Start: 2024-02-25 | End: 2024-02-25 | Stop reason: HOSPADM

## 2024-02-25 RX ORDER — NALOXONE HYDROCHLORIDE 0.4 MG/ML
0.4 INJECTION, SOLUTION INTRAMUSCULAR; INTRAVENOUS; SUBCUTANEOUS
Status: DISCONTINUED | OUTPATIENT
Start: 2024-02-25 | End: 2024-02-25 | Stop reason: HOSPADM

## 2024-02-25 RX ORDER — ROPIVACAINE HYDROCHLORIDE 2 MG/ML
INJECTION, SOLUTION EPIDURAL; INFILTRATION; PERINEURAL
Status: COMPLETED | OUTPATIENT
Start: 2024-02-25 | End: 2024-02-25

## 2024-02-25 RX ORDER — OXYTOCIN 10 [USP'U]/ML
10 INJECTION, SOLUTION INTRAMUSCULAR; INTRAVENOUS
Status: DISCONTINUED | OUTPATIENT
Start: 2024-02-25 | End: 2024-02-25 | Stop reason: HOSPADM

## 2024-02-25 RX ORDER — SODIUM CHLORIDE, SODIUM LACTATE, POTASSIUM CHLORIDE, CALCIUM CHLORIDE 600; 310; 30; 20 MG/100ML; MG/100ML; MG/100ML; MG/100ML
INJECTION, SOLUTION INTRAVENOUS CONTINUOUS PRN
Status: DISCONTINUED | OUTPATIENT
Start: 2024-02-25 | End: 2024-02-25 | Stop reason: HOSPADM

## 2024-02-25 RX ORDER — DOCUSATE SODIUM 100 MG/1
100 CAPSULE, LIQUID FILLED ORAL DAILY
Status: DISCONTINUED | OUTPATIENT
Start: 2024-02-25 | End: 2024-02-27 | Stop reason: HOSPADM

## 2024-02-25 RX ORDER — METOCLOPRAMIDE 10 MG/1
10 TABLET ORAL EVERY 6 HOURS PRN
Status: DISCONTINUED | OUTPATIENT
Start: 2024-02-25 | End: 2024-02-25 | Stop reason: HOSPADM

## 2024-02-25 RX ORDER — METOCLOPRAMIDE HYDROCHLORIDE 5 MG/ML
10 INJECTION INTRAMUSCULAR; INTRAVENOUS EVERY 6 HOURS PRN
Status: DISCONTINUED | OUTPATIENT
Start: 2024-02-25 | End: 2024-02-25 | Stop reason: HOSPADM

## 2024-02-25 RX ORDER — CITRIC ACID/SODIUM CITRATE 334-500MG
30 SOLUTION, ORAL ORAL
Status: DISCONTINUED | OUTPATIENT
Start: 2024-02-25 | End: 2024-02-25 | Stop reason: HOSPADM

## 2024-02-25 RX ORDER — MISOPROSTOL 200 UG/1
800 TABLET ORAL
Status: DISCONTINUED | OUTPATIENT
Start: 2024-02-25 | End: 2024-02-27 | Stop reason: HOSPADM

## 2024-02-25 RX ORDER — IBUPROFEN 400 MG/1
800 TABLET, FILM COATED ORAL EVERY 6 HOURS PRN
Status: DISCONTINUED | OUTPATIENT
Start: 2024-02-25 | End: 2024-02-27 | Stop reason: HOSPADM

## 2024-02-25 RX ORDER — MISOPROSTOL 200 UG/1
800 TABLET ORAL
Status: DISCONTINUED | OUTPATIENT
Start: 2024-02-25 | End: 2024-02-25 | Stop reason: HOSPADM

## 2024-02-25 RX ORDER — TRANEXAMIC ACID 10 MG/ML
1 INJECTION, SOLUTION INTRAVENOUS EVERY 30 MIN PRN
Status: DISCONTINUED | OUTPATIENT
Start: 2024-02-25 | End: 2024-02-27 | Stop reason: HOSPADM

## 2024-02-25 RX ORDER — MISOPROSTOL 200 UG/1
400 TABLET ORAL
Status: DISCONTINUED | OUTPATIENT
Start: 2024-02-25 | End: 2024-02-27 | Stop reason: HOSPADM

## 2024-02-25 RX ORDER — ROPIVACAINE HYDROCHLORIDE 2 MG/ML
10 INJECTION, SOLUTION EPIDURAL; INFILTRATION; PERINEURAL ONCE
Status: DISCONTINUED | OUTPATIENT
Start: 2024-02-25 | End: 2024-02-25 | Stop reason: HOSPADM

## 2024-02-25 RX ORDER — HYDROCORTISONE 25 MG/G
CREAM TOPICAL 3 TIMES DAILY PRN
Status: DISCONTINUED | OUTPATIENT
Start: 2024-02-25 | End: 2024-02-27 | Stop reason: HOSPADM

## 2024-02-25 RX ORDER — IBUPROFEN 400 MG/1
800 TABLET, FILM COATED ORAL
Status: DISCONTINUED | OUTPATIENT
Start: 2024-02-25 | End: 2024-02-26

## 2024-02-25 RX ORDER — OXYTOCIN 10 [USP'U]/ML
10 INJECTION, SOLUTION INTRAMUSCULAR; INTRAVENOUS
Status: DISCONTINUED | OUTPATIENT
Start: 2024-02-25 | End: 2024-02-27 | Stop reason: HOSPADM

## 2024-02-25 RX ORDER — NALOXONE HYDROCHLORIDE 0.4 MG/ML
0.2 INJECTION, SOLUTION INTRAMUSCULAR; INTRAVENOUS; SUBCUTANEOUS
Status: DISCONTINUED | OUTPATIENT
Start: 2024-02-25 | End: 2024-02-25 | Stop reason: HOSPADM

## 2024-02-25 RX ORDER — LOPERAMIDE HCL 2 MG
2 CAPSULE ORAL
Status: DISCONTINUED | OUTPATIENT
Start: 2024-02-25 | End: 2024-02-25 | Stop reason: HOSPADM

## 2024-02-25 RX ORDER — LOPERAMIDE HCL 2 MG
4 CAPSULE ORAL
Status: DISCONTINUED | OUTPATIENT
Start: 2024-02-25 | End: 2024-02-27 | Stop reason: HOSPADM

## 2024-02-25 RX ORDER — OXYTOCIN 10 [USP'U]/ML
10 INJECTION, SOLUTION INTRAMUSCULAR; INTRAVENOUS
Status: DISCONTINUED | OUTPATIENT
Start: 2024-02-25 | End: 2024-02-26

## 2024-02-25 RX ORDER — FENTANYL CITRATE 50 UG/ML
50 INJECTION, SOLUTION INTRAMUSCULAR; INTRAVENOUS ONCE
Status: COMPLETED | OUTPATIENT
Start: 2024-02-25 | End: 2024-02-25

## 2024-02-25 RX ORDER — SODIUM CHLORIDE, SODIUM LACTATE, POTASSIUM CHLORIDE, CALCIUM CHLORIDE 600; 310; 30; 20 MG/100ML; MG/100ML; MG/100ML; MG/100ML
INJECTION, SOLUTION INTRAVENOUS CONTINUOUS
Status: DISCONTINUED | OUTPATIENT
Start: 2024-02-25 | End: 2024-02-25 | Stop reason: HOSPADM

## 2024-02-25 RX ORDER — FENTANYL CITRATE 50 UG/ML
100 INJECTION, SOLUTION INTRAMUSCULAR; INTRAVENOUS
Status: DISCONTINUED | OUTPATIENT
Start: 2024-02-25 | End: 2024-02-25 | Stop reason: HOSPADM

## 2024-02-25 RX ORDER — LOPERAMIDE HCL 2 MG
2 CAPSULE ORAL
Status: DISCONTINUED | OUTPATIENT
Start: 2024-02-25 | End: 2024-02-27 | Stop reason: HOSPADM

## 2024-02-25 RX ADMIN — Medication: at 10:17

## 2024-02-25 RX ADMIN — Medication 100 ML/HR: at 17:32

## 2024-02-25 RX ADMIN — IBUPROFEN 800 MG: 400 TABLET ORAL at 18:27

## 2024-02-25 RX ADMIN — Medication 2 MILLI-UNITS/MIN: at 15:41

## 2024-02-25 RX ADMIN — SODIUM CHLORIDE, POTASSIUM CHLORIDE, SODIUM LACTATE AND CALCIUM CHLORIDE: 600; 310; 30; 20 INJECTION, SOLUTION INTRAVENOUS at 10:40

## 2024-02-25 RX ADMIN — DOCUSATE SODIUM 100 MG: 100 CAPSULE, LIQUID FILLED ORAL at 18:28

## 2024-02-25 RX ADMIN — ACETAMINOPHEN 650 MG: 325 TABLET, FILM COATED ORAL at 18:28

## 2024-02-25 RX ADMIN — FENTANYL CITRATE 50 MCG: 50 INJECTION, SOLUTION INTRAMUSCULAR; INTRAVENOUS at 10:14

## 2024-02-25 RX ADMIN — ROPIVACAINE HYDROCHLORIDE 10 ML: 2 INJECTION, SOLUTION EPIDURAL; INFILTRATION at 10:42

## 2024-02-25 RX ADMIN — SODIUM CHLORIDE, POTASSIUM CHLORIDE, SODIUM LACTATE AND CALCIUM CHLORIDE 1000 ML: 600; 310; 30; 20 INJECTION, SOLUTION INTRAVENOUS at 09:36

## 2024-02-25 ASSESSMENT — ACTIVITIES OF DAILY LIVING (ADL)
ADLS_ACUITY_SCORE: 20

## 2024-02-25 NOTE — TELEPHONE ENCOUNTER
"OB Triage Call      Is patient's OB/Midwife with the [facility] or [facility]? [facility]- Proceed with triage     Reason for call: She went to  and 80 minutes ago she started having intense cramping in her back and pelvis. Approximately every 5 minutes. Changing positions and walking didn't stop it. 35 minute drive to [facility] She thinks she lost her mucus plug yesterday, no obvious vaginal drainage. Now having bloody show while on the call.    Assessment: Actively laboring    Plan: vaginal delivery     Patient plans to deliver at [facility]    Patient's primary OB Provider is BABAR Nayak.      Per protocol recommendations Patient to be evaluated in L&D. Patient's primary OB is [facility].  Labor and delivery at [facility] ([phone]) notified of patient's pending arrival.  Report given to JASON Owen.      Is patient's delivering hospital on divert? No      38w3d    Estimated Date of Delivery: Mar 6, 2024        OB History    Para Term  AB Living   2 0 0 0 1 0   SAB IAB Ectopic Multiple Live Births   1 0 0 0 0      # Outcome Date GA Lbr Adam/2nd Weight Sex Delivery Anes PTL Lv   2 Current            1 SAB 23 5w0d              No results found for: \"GBS\"       Vandana London RN 24 11:38 PM  [facility] Nurse Advisor  Reason for Disposition   [1] First baby (primipara) AND [2] contractions < 6 minutes apart  AND [3] present 2 hours    Additional Information   Negative: Passed out (i.e., lost consciousness, collapsed and was not responding)   Negative: Shock suspected (e.g., cold/pale/clammy skin, too weak to stand, low BP, rapid pulse)   Negative: Difficult to awaken or acting confused (e.g., disoriented, slurred speech)   Negative: [1] SEVERE abdominal pain (e.g., excruciating) AND [2] constant AND [3] present > 1 hour   Negative: SEVERE bleeding (e.g., continuous red blood from vagina, or large blood clots)   Negative: Umbilical cord hanging out of the vagina (shiny, white, " "curled appearance, \"like telephone cord\")   Negative: Uncontrollable urge to push (i.e., feels like baby is coming out now)   Negative: Can see baby   Negative: Sounds like a life-threatening emergency to the triager   Negative: Pregnant < 37 weeks (i.e., )   Negative: [1] Uncertain delivery date AND [2] possibly pregnant < 37 weeks (i.e., )    Protocols used: Pregnancy - Labor-A-AH    "

## 2024-02-25 NOTE — PLAN OF CARE
EDWINA paged for epidural at 0959. Dr. Randle in room at 1042. Medications, Ropivacaine, handed off to EDWINA at this time.  Time out performed.  Pt sat up at edge of bed. EFM off during procedure. Pt tolerated procedure well. Into left tilt position at 1055, EFM reapplied. BP set for every 2 minutes with continuous pulse oximeter in place.

## 2024-02-25 NOTE — L&D DELIVERY NOTE
OB Vaginal Delivery Note    Jenna Tripp MRN# [id number]   Age: 28 year old YOB: 1995       GA: 38w4d  GP:   Labor Complications: None   Delivery QBL: 579 mL. Majority from brisk bleeding from vaginal laceration.  Delivery Type: Vaginal, Spontaneous   ROM to Delivery Time: (Delivered) Hours: 8 Minutes: 2  [place] Weight:     1 Minute 5 Minute 10 Minute   Apgar Totals: 8   9        EVELYN BOLDEN;DEONTE GASCA;SOULEYMANE MCDONNELL;DAVION ALEGRIA     Delivery Details:  Jenna Tripp, a 28 year old  female delivered a viable infant with apgars of 8  and 9 . Patient was fully dilated and pushing after   hours   minutes in active labor. Delivery was via vaginal, spontaneous  to a sterile field under epidural  anesthesia. Infant delivered in vertex  right  occiput  anterior  position. Anterior and posterior shoulders delivered without difficulty. The cord was clamped, cut twice and   were noted. Cord blood was obtained in routine fashion with the following disposition: discard .      Cord complications: none   Placenta delivered at 2024  5:10 PM . Placental disposition was Hospital disposal . Fundal massage performed and fundus found to be firm.     Episiotomy: none    Perineum, vagina, cervix were inspected, and the following lacerations were noted:   Perineal lacerations: 2nd   periurethral laceration: bilateral             Any lacerations were repaired in the usual fashion using 2-0 Chromic, 3-0 & 4-0 Vicryl.    Excellent hemostasis was noted. Needle count correct. Infant and patient in delivery room in good and stable condition.        Magdaleno Tripp-Jenna [[id number]]      Labor Event Times      Latent labor onset date/time: 2024 0200    Active labor onset date: 24 Onset time:  9:45 AM   Dilation complete date: 24 Complete time:  3:05 PM   Start pushing date/time: 2024 1513          Labor Events     labor?: No   steroids: None  Labor Type:  Spontaneous, AROM     Antibiotics received during labor?: No       Rupture date/time: 24 0857   Rupture type: Artificial Rupture of Membranes  Fluid color: Clear  Fluid odor: Normal     Augmentation: AROM, Oxytocin  Indications for augmentation: Ineffective Contraction Pattern       Delivery/Placenta Date and Time      Delivery Date: 24 Delivery Time:  4:59 PM   Placenta Date/Time: 2024  5:10 PM  Oxytocin given at the time of delivery: after delivery of baby  Delivering clinician: Adriana Nayak MD   Other personnel present at delivery:  Provider Role   Araseli Bolden, RN Registered Nurse   Willa Richard, RN Delivery Nurse   Stormy Bentley RN Delivery Nurse   Cheyenne Ayala              Vaginal Counts       Initial count performed by 2 team members:  Two Team Members   Dr. Nael Richard         Needles Suture Needles Sponges (RETIRED) Instruments   Initial counts 2 0 5    Added to count  3 5    Relief counts       Final counts               Placed during labor Accounted for at the end of labor   FSE NA NA   IUPC NA NA   Cervidil NA NA                             Apgars    Living status: Living   1 Minute 5 Minute 10 Minute 15 Minute 20 Minute   Skin color: 0  1       Heart rate: 2  2       Reflex irritability: 2  2       Muscle tone: 2  2       Respiratory effort: 2  2       Total: 8  9       Apgars assigned by: ROBERTO BOLDEN RNC       Cord      Cord Complications: None               Cord Blood Disposition: Discard    Gases Sent?: No    Delayed cord clamping?: Yes    Cord Clamping Delay (seconds):  seconds    Stem cell collection?: No           Napoleon Resuscitation    Methods: None   Care at Delivery: Data: male baby born at 1659. Delivery remarkable for N/A  Action: Interventions at birth were drying, bulb suctioning, and warm blankets. Infant placed skin-to-skin with mother.  Response: Stable . Positive bonding behaviors observed.             Skin to Skin and Feeding  Plan      Skin to skin initiation date/time: 1/2/1841    Skin to skin with: Mother  Skin to skin end date/time:            Labor Events and Shoulder Dystocia    Fetal Tracing Prior to Delivery: Category 1  Shoulder dystocia present?: Neg       Delivery (Maternal) (Provider to Complete) ([id number])    Episiotomy: None  Perineal lacerations: 2nd Repaired?: Yes     Periurethral laceration: bilateral Repaired?: Yes   Repair suture: 3-0 Vicryl, 4-0 Vicryl, 2-0 Chromic  Genital tract inspection done: Pos       Blood Loss  Mother: Jenna Tripp #[id number]     Start of Mother's Information      Delivery Blood Loss  02/25/24 0945 - 02/25/24 1739      Delivery QBL (mL) Hospital Encounter 579 mL    Total  579 mL               End of Mother's Information  Mother: Jenna Tripp #[id number]                Delivery - Provider to Complete ([id number])    Delivering clinician: Adriana Nayak MD  Delivery Type (Choose the 1 that will go to the Birth History): Vaginal, Spontaneous                         Other personnel:  Provider Role   Araseli Hamlin, RN Registered Nurse   Willa Richard RN Delivery Nurse   Stormy Bentley, RN Delivery Nurse   Cheyenne Ayala                     Placenta    Date/Time: 2/25/2024  5:10 PM  Removal: Spontaneous  Disposition: Hospital disposal             Anesthesia    Method: Epidural                    Presentation and Position    Presentation: Vertex    Position: Right Occiput Anterior                     Adriana Nayak MD

## 2024-02-25 NOTE — H&P
[facility]    History and Physical  Obstetrics and Gynecology     Date of Admission:  2024    Assessment & Plan   Jenna Tripp is a 28 year old female    ASSESSMENT:   IUP @ 38w4d in early labor.  NST reactive.  Category  I    PLAN:   Admit - see IP orders  Labor augmentation with AROM and possible Pitocin  Pain medication as needed    Adriana Nayak MD    History of Present Illness   Jenna Tripp is a 28 year old female  38w4d  Estimated Date of Delivery: Mar 6, 2024 is calculated from Patient's last menstrual period was 2023 (exact date). [facility].    PRENATAL COURSE  Prenatal course was essentially uncomplicated    She did have mild anemia early in pregnancy, but this resolved with iron supplementation.    Recent Labs   Lab Test 24  0331   AS Negative     Rhogam not indicated   Recent Labs   Lab Test 23  0916   HEPBANG Nonreactive   HIAGAB Nonreactive   RUQIGG Positive       Past Medical History    I have reviewed this patient's medical history and updated it with pertinent information if needed.   Past Medical History:   Diagnosis Date    Known health problems: none        Past Surgical History   I have reviewed this patient's surgical history and updated it with pertinent information if needed.  Past Surgical History:   Procedure Laterality Date    TONSILLECTOMY & ADENOIDECTOMY  2017       Prior to Admission Medications   Prior to Admission Medications   Prescriptions Last Dose Informant Patient Reported? Taking?   Prenatal Vit-Fe Fumarate-FA (PRENATAL MULTIVITAMIN W/IRON) 27-0.8 MG tablet Past Week  Yes Yes   Sig: Take 1 tablet by mouth daily   polyethylene glycol (MIRALAX) 17 g packet More than a month  Yes Yes   Sig: Take 1 packet by mouth daily      Facility-Administered Medications: None     Allergies   No Known Allergies    Social History   I have reviewed this patient's social history and updated it with  pertinent information if needed. Jenna Tripp  reports that she has never smoked. She has never used smokeless tobacco. She reports that she does not currently use alcohol.    Family History   I have reviewed this patient's family history and updated it with pertinent information if needed.   Family History   Problem Relation Age of Onset    Hyperlipidemia Mother     Hypertension Father     Leukemia Maternal Grandmother     Diabetes Maternal Grandmother     Prostate Cancer Maternal Grandfather     Breast Cancer Paternal Grandmother     Squamous cell carcinoma Paternal Grandmother         tongue    Lung Cancer Paternal Grandmother     Bladder Cancer Paternal Grandfather        Physical Exam   Temp: 98.1  F (36.7  C) Temp src: Temporal BP: 134/85 Pulse: 76   Resp: 16   O2 Device: None (Room air)    Vital Signs with Ranges  Temp:  [98.1  F (36.7  C)-98.3  F (36.8  C)] 98.1  F (36.7  C)  Pulse:  [76] 76  Resp:  [16] 16  BP: (134-143)/(76-85) 134/85    Abdomen: gravid, single vertex fetus, non-tender, EFW 8 lbs 0  Cervical Exam: 3.5/ 80/ Mid/ average/ -1. AROM, clear fluid    Fetal Heart Tones: 130 baseline, moderate variablility, + accels, no decels, and Category I  TOCO:   frequency q 3-5 minutes    Constitutional: healthy, alert, and active   Respiratory: No increased work of breathing, good air exchange, clear to auscultation bilaterally, no crackles or wheezing  Cardiovascular: normal S1 and S2  Skin/Extremites: normal skin color, texture, turgor  Neurologic: Awake, alert, oriented to name, place and time.

## 2024-02-25 NOTE — ANESTHESIA PROCEDURE NOTES
Epidural catheter Procedure Note    Pre-Procedure   Staff -        Anesthesiologist:  Louie Randle MD       Performed By: anesthesiologist       Location: OB       Procedure Start/Stop Times: 2/25/2024 10:42 AM and 2/25/2024 10:56 AM       Pre-Anesthestic Checklist: patient identified, IV checked, site marked, risks and benefits discussed, informed consent, monitors and equipment checked, pre-op evaluation and at physician/surgeon's request  Timeout:       Correct Patient: Yes        Correct Procedure: Yes        Correct Site: Yes        Correct Position: Yes   Procedure Documentation  Procedure: epidural catheter       Patient Position: sitting       Skin prep: Betadine       Local skin infiltrated with 1 mL of 1% lidocaine.        Insertion Site: L3-4. (midline approach).       Technique: LORT saline and LORT air        RACHAEL at 5.5 cm.       Needle Type: Touhy needle       Needle Gauge: 17.        Needle Length (Inches): 3.5        Catheter: 19 G.          Catheter threaded easily.         4.5 cm epidural space.         Threaded 10 cm at skin.         # of attempts: 1 and  # of redirects:     Assessment/Narrative         Paresthesias: No.       Test dose of 3 mL lidocaine 1.5% w/ 1:200,000 epinephrine at.         Test dose negative, 3 minutes after injection, for signs of intravascular, subdural, or intrathecal injection.       Insertion/Infusion Method: LORT saline and LORT air       Aspiration negative for Heme or CSF via Epidural Catheter.    Medication(s) Administered   0.2% Ropivacaine (Epidural) - EPIDURAL   10 mL - 2/25/2024 10:42:00 AM  Medication Administration Time: 2/25/2024 10:42 AM     Comments:  Pre-procedure time out completed. Patient in sitting position, the lumbar spine was prepped and draped in sterile fashion. The L3/L4 interspace was identified and local anesthetic was injected for local skin infiltration. A 17 G touhy needle was advanced to the epidural space which was confirmed with the  "loss of resistance technique at 5.5 cm. A catheter was then advanced easily into the epidural space. The catheter was left at 10 cm at the skin. Negative aspiration of blood and CSF was confirmed. A test dose of 1.5% lidocaine with 1:200,000 epinephrine was injected through the catheter and was negative for intravascular injection. The site was covered with sterile tegaderm and the catheter was secured with tape.    Orders to manage the epidural infusion have been entered and, through coordination with the nurse, we will continue to manage and monitor the patient's labor epidural.  We will continuously be available to adjust as needed throughout the entire labor and delivery process.      FOR [facility] ([facility] [place]) ONLY:   Pain Team Contact information: please page the Pain Team Via [employer]. Search \"Pain\". During daytime hours, please page the attending first. At night please page the resident first.      "

## 2024-02-25 NOTE — PROGRESS NOTES
Jenna Tripp admitted to [facility], ambulatory per services of Dr Tan for evaluation of Cramping and mucus like discharge.  Pt states lost mucus plug yesterday. Tonight had some more discharge with red streak in it. Strong cramping.  Discussed plan of care including EFM, routine VS, and SVE.  Pt agreeable.  EFM applied and admission assessment completed.

## 2024-02-25 NOTE — ANESTHESIA PREPROCEDURE EVALUATION
"Anesthesia Pre-Procedure Evaluation    Patient: Jenna Tripp   MRN: [id number] : 1995        Procedure :           Past Medical History:   Diagnosis Date    Known health problems: none       Past Surgical History:   Procedure Laterality Date    TONSILLECTOMY & ADENOIDECTOMY  2017      No Known Allergies   Social History     Tobacco Use    Smoking status: Never    Smokeless tobacco: Never   Substance Use Topics    Alcohol use: Not Currently      Wt Readings from Last 1 Encounters:   24 89.4 kg (197 lb)        Anesthesia Evaluation   Pt has had prior anesthetic.     No history of anesthetic complications       ROS/MED HX  ENT/Pulmonary:  - neg pulmonary ROS     Neurologic:  - neg neurologic ROS     Cardiovascular:       METS/Exercise Tolerance: >4 METS    Hematologic:  - neg hematologic  ROS     Musculoskeletal:  - neg musculoskeletal ROS     GI/Hepatic:  - neg GI/hepatic ROS     Renal/Genitourinary:  - neg Renal ROS     Endo:  - neg endo ROS     Psychiatric/Substance Use:  - neg psychiatric ROS     Infectious Disease:       Malignancy:       Other:            Physical Exam    Airway        Mallampati: II   TM distance: > 3 FB   Neck ROM: full   Mouth opening: > 3 cm    Respiratory Devices and Support         Dental       (+) Minor Abnormalities - some fillings, tiny chips      Cardiovascular   cardiovascular exam normal          Pulmonary   pulmonary exam normal                OUTSIDE LABS:  CBC:   Lab Results   Component Value Date    WBC 8.1 2024    WBC 8.0 2023    HGB 11.8 2024    HGB 12.0 2024    HCT 34.1 (L) 2024    HCT 30.6 (L) 2023     (L) 2024     2023     BMP:   Lab Results   Component Value Date     2023    POTASSIUM 4.6 2023    CHLORIDE 105 2023    CO2 26 2023    BUN 10.7 2023    CR 0.77 2023     (H) 2023     COAGS: No results found for: \"PTT\", \"INR\", \"FIBR\"  POC:   Lab " Results   Component Value Date    HCGS Negative 06/19/2023     HEPATIC:   Lab Results   Component Value Date    ALBUMIN 4.4 06/19/2023    PROTTOTAL 7.0 06/19/2023    ALT 17 06/19/2023    AST 16 06/19/2023    ALKPHOS 57 06/19/2023    BILITOTAL 0.3 06/19/2023     OTHER:   Lab Results   Component Value Date    CHYNA 9.8 06/19/2023       Anesthesia Plan    ASA Status:  2       Anesthesia Type: Epidural.              Consents    Anesthesia Plan(s) and associated risks, benefits, and realistic alternatives discussed. Questions answered and patient/representative(s) expressed understanding.     - Discussed:     - Discussed with:  Patient            Postoperative Care            Comments:               Louie Randle MD    I have reviewed the pertinent notes and labs in the chart from the past 30 days and (re)examined the patient.  Any updates or changes from those notes are reflected in this note.

## 2024-02-26 LAB — HGB BLD-MCNC: 9.6 G/DL (ref 11.7–15.7)

## 2024-02-26 PROCEDURE — 250N000013 HC RX MED GY IP 250 OP 250 PS 637: Performed by: OBSTETRICS & GYNECOLOGY

## 2024-02-26 PROCEDURE — 36415 COLL VENOUS BLD VENIPUNCTURE: CPT | Performed by: OBSTETRICS & GYNECOLOGY

## 2024-02-26 PROCEDURE — 85018 HEMOGLOBIN: CPT | Performed by: OBSTETRICS & GYNECOLOGY

## 2024-02-26 PROCEDURE — 120N000012 HC R&B POSTPARTUM

## 2024-02-26 RX ADMIN — IBUPROFEN 800 MG: 400 TABLET ORAL at 07:04

## 2024-02-26 RX ADMIN — IBUPROFEN 800 MG: 400 TABLET ORAL at 13:36

## 2024-02-26 RX ADMIN — ACETAMINOPHEN 650 MG: 325 TABLET, FILM COATED ORAL at 01:06

## 2024-02-26 RX ADMIN — ACETAMINOPHEN 650 MG: 325 TABLET, FILM COATED ORAL at 19:45

## 2024-02-26 RX ADMIN — IBUPROFEN 800 MG: 400 TABLET ORAL at 01:06

## 2024-02-26 RX ADMIN — ACETAMINOPHEN 650 MG: 325 TABLET, FILM COATED ORAL at 07:04

## 2024-02-26 RX ADMIN — IBUPROFEN 800 MG: 400 TABLET ORAL at 19:45

## 2024-02-26 RX ADMIN — DOCUSATE SODIUM 100 MG: 100 CAPSULE, LIQUID FILLED ORAL at 08:22

## 2024-02-26 RX ADMIN — ACETAMINOPHEN 650 MG: 325 TABLET, FILM COATED ORAL at 13:36

## 2024-02-26 ASSESSMENT — ACTIVITIES OF DAILY LIVING (ADL)
ADLS_ACUITY_SCORE: 20
ADLS_ACUITY_SCORE: 21
ADLS_ACUITY_SCORE: 20
ADLS_ACUITY_SCORE: 21
ADLS_ACUITY_SCORE: 20
ADLS_ACUITY_SCORE: 21

## 2024-02-26 NOTE — PLAN OF CARE
Goal Outcome Evaluation:      Plan of Care Reviewed With: patient, spouse    Overall Patient Progress: improvingOverall Patient Progress: improving       Pt states that pain is being well managed. Pt is up and moving independently, voiding well. Vitally stable. Fundus is firm with minimal lochia present. Bonding well with . Pumping. Plan of care continues.

## 2024-02-26 NOTE — PROGRESS NOTES
Data: Jenna Tripp transferred to  Room 428 via wheelchair at 2000. Baby transferred via parent's arms.  Action: Receiving unit notified of transfer: Yes. Patient and family notified of room change. Report given to Jules GOMEZ at 2010. Belongings sent to receiving unit. Accompanied by Registered Nurse. Oriented patient to surroundings. Call light within reach. ID bands double-checked with receiving RN.  Response: Patient tolerated transfer and is stable.

## 2024-02-26 NOTE — ANESTHESIA POSTPROCEDURE EVALUATION
Patient: Jenna Tripp    Procedure: * No procedures listed *       Anesthesia Type:  Epidural    Note:  Disposition: Inpatient   Postop Pain Control: Uneventful            Sign Out: Well controlled pain   PONV: No   Neuro/Psych: Uneventful            Sign Out: Acceptable/Baseline neuro status   Airway/Respiratory: Uneventful            Sign Out: Acceptable/Baseline resp. status   CV/Hemodynamics: Uneventful            Sign Out: Acceptable CV status; No obvious hypovolemia; No obvious fluid overload   Other NRE: NONE   DID A NON-ROUTINE EVENT OCCUR? No    Event details/Postop Comments:  The patient denies numbness, weakness, or tingling in either of the lower extremities; denies positional headache; and denies significant back pain. The patient was then counseled on any potential concerning symptoms and if/when to reach out for further guidance. They expressed understanding of the instructions and felt comfortable with the plan.           Last vitals:  Vitals:    02/26/24 0357 02/26/24 0809 02/26/24 1336   BP: 129/84 116/80 129/84   Pulse: 53 56 60   Resp: 16 18 16   Temp:  36.7  C (98  F)    SpO2:          Electronically Signed By: Louie Randle MD  February 26, 2024  5:06 PM

## 2024-02-26 NOTE — LACTATION NOTE
Routine Lactation visit with Jenna, significant other Tru & baby boy Horacio.  Horacio has been sleepy with feedings, and Jenna has been hand expressing and pumping. At time of visit, she was working on feeding and Horacio was awake. We worked on positioning on right side in cross cradle hold, reviewed how to hold her breast with a deep U hold, and [place] attempted to latch several times, took a few sucks, but did not stay latched. Recommended trying a nipple shield (one had already been tried previously), and placed 24mm shield. Horacio was then able to latch and able to start a nutritive suck pattern. He fed on both sides during visit in cross cradle hold, audible swallows heard and colostrum seen in shield when changing sides. Jenna was very happy with how much better he was feeding at this feeding. Offered support and encouragement.    Discussed cluster feeding, what it is and when to expect it, The Second Night, satiety cues, feeding cues, and reviewed Feeding Log for home use. Encouraged to review Breastfeeding section in Your Guide to Postpartum & [place] Care.    Feeding plan: Recommend unlimited, frequent breast feedings: At least 8 - 12 times every 24 hours. Avoid pacifiers and supplementation with formula unless medically indicated. If Horacio is sleepy, or has poor feedings, pump and give back any colostrum she gets. Encouraged use of feeding log and to record feedings, and void/stool patterns. Jenna has a breast pump for home use. Reviewed outpatient lactation resources. Jenna & Tru appreciative of visit.    Lauren Velasco, RN, BSN, MNN, IBCLC

## 2024-02-26 NOTE — PLAN OF CARE
Goal Outcome Evaluation:      Plan of Care Reviewed With: patient, spouse    Overall Patient Progress: improvingOverall Patient Progress: improving     VSS. Fundus firm and midline. Scant flow. Pain 2/10, pain controled with tylenol and ibuprofen per MAR. Breastfeeding/ Pumping. Ambulating free of dizziness or lightheaded. Voiding without difficulty. Encouraged to call with needs, questions, or concerns.

## 2024-02-26 NOTE — DISCHARGE INSTRUCTIONS
Warning Signs after Having a Baby    Keep this paper on your fridge or somewhere else where you can see it.    Call your provider if you have any of these symptoms up to 12 weeks after having your baby.    Thoughts of hurting yourself or your baby  Pain in your chest or trouble breathing  Severe headache not helped by pain medicine  Eyesight concerns (blurry vision, seeing spots or flashes of light, other changes to eyesight)  Fainting, shaking or other signs of a seizure    Call 9-1-1 if you feel that it is an emergency.     The symptoms below can happen to anyone after giving birth. They can be very serious. Call your provider if you have any of these warning signs.    My provider s phone number: _______________________    Losing too much blood (hemorrhage)    Call your provider if you soak through a pad in less than an hour or pass blood clots bigger than a golf ball. These may be signs that you are bleeding too much.    Blood clots in the legs or lungs    After you give birth, your body naturally clots its blood to help prevent blood loss. Sometimes this increased clotting can happen in other areas of the body, like the legs or lungs. This can block your blood flow and be very dangerous.     Call your provider if you:  Have a red, swollen spot on the back of your leg that is warm or painful when you touch it.   Are coughing up blood.     Infection    Call your provider if you have any of these symptoms:  Fever of 100.4 F (38 C) or higher.  Pain or redness around your stitches if you had an incision.   Any yellow, white, or green fluid coming from places where you had stitches or surgery.    Mood Problems (postpartum depression)    Many people feel sad or have mood changes after having a baby. But for some people, these mood swings are worse.     Call your provider right away if you feel so anxious or nervous that you can't care for yourself or your baby.    Preeclampsia (high blood pressure)    Even if you  "didn't have high blood pressure when you were pregnant, you are at risk for the high blood pressure disease called preeclampsia. This risk can last up to 12 weeks after giving birth.     Call your provider if you have:   Pain on your right side under your rib cage  Sudden swelling in the hands and face    Remember: You know your body. If something doesn't feel right, get medical help.     For informational purposes only. Not to replace the advice of your health care provider. Copyright 2020 [employer] [facility]. All rights reserved. Clinically reviewed by Bibiana Guzman, RNC-OB, MSN. [employer] [id number] - Rev .  Postpartum: Care Instructions  Overview  After childbirth (postpartum period), your body goes through many changes. Some of these changes happen over several weeks. In the hours after delivery, your body will begin to recover from childbirth while it prepares to breastfeed your . You may feel emotional during this time. Your hormones can shift your mood without warning for no clear reason.  In the first couple of weeks after childbirth, it's common to have emotions that change from happy to sad. You may find it hard to sleep. You may cry a lot. This is called the \"baby blues.\" These overwhelming emotions often go away within a couple of days or weeks. But it's important to discuss your feelings with your doctor.  It's easy to get too tired and overwhelmed during the first weeks after childbirth. Don't try to do too much. Get rest whenever you can, accept help from others, and eat well and drink plenty of fluids.  In the first couple of weeks after you give birth, your doctor or midwife may want to check in with you and make a plan for any follow-up care you may need. You will likely have a complete postpartum visit in the first 3 months after delivery. At that time, your doctor or midwife will check on your recovery from childbirth and see how you're doing with your emotions. You may also " discuss your concerns or questions.  Follow-up care is a key part of your treatment and safety. Be sure to make and go to all appointments, and call your doctor if you are having problems. It's also a good idea to know your test results and keep a list of the medicines you take.  How can you care for yourself at home?  Sleep or rest when your baby sleeps.  Get help with household chores from family or friends, if you can. Don't try to do it all yourself.  If you have hemorrhoids or swelling or pain around the opening of your vagina, try using cold and heat. You can put ice or a cold pack on the area for 10 to 20 minutes at a time. Put a thin cloth between the ice and your skin. Also try sitting in a few inches of warm water (sitz bath) 3 times a day and after bowel movements.  Take pain medicines exactly as directed.  If the doctor gave you a prescription medicine for pain, take it as prescribed.  If you are not taking a prescription pain medicine, ask your doctor if you can take an over-the-counter medicine.  Eat more fiber to avoid constipation. Include foods such as whole-grain breads and cereals, raw vegetables, raw and dried fruits, and beans.  Drink plenty of fluids. If you have kidney, heart, or liver disease and have to limit fluids, talk with your doctor before you increase the amount of fluids you drink.  Do not rinse inside your vagina with fluids (douche).  If you have stitches, keep the area clean by pouring or spraying warm water over the area outside your vagina and anus after you use the toilet.  Keep a list of questions to ask your doctor or midwife. Your questions might be about:  Changes in your breasts, such as lumps or soreness.  When to expect your menstrual period to start again.  What form of birth control is best for you.  Weight you have put on during the pregnancy.  Exercise options.  What foods and drinks are best for you, especially if you are breastfeeding.  Problems you might be having  with breastfeeding.  When you can have sex. You may want to talk about lubricants for your vagina.  Any feelings of sadness or restlessness that you are having.  When should you call for help?  Share this information with your partner, family, or a friend. They can help you watch for warning signs.  Call 911  anytime you think you may need emergency care. For example, call if:    You have thoughts of harming yourself, your baby, or another person.     You passed out (lost consciousness).     You have chest pain, are short of breath, or cough up blood.     You have a seizure.   Where to get help 24 hours a day, 7 days a week   If you or someone you know talks about suicide, self-harm, a mental health crisis, a substance use crisis, or any other kind of emotional distress, get help right away. You can:    Call the Suicide and Crisis Lifeline at 988.     Call 4-429-500-TALK (1-410.961.9618).     Text HOME to 682358 to access the Crisis Text Line.   Consider saving these numbers in your phone.  Go to Not iT for more information or to chat online.  Call your doctor now or seek immediate medical care if:    You have signs of hemorrhage (too much bleeding), such as:  Heavy vaginal bleeding. This means that you are soaking through one or more pads in an hour. Or you pass blood clots bigger than an egg.  Feeling dizzy or lightheaded, or you feel like you may faint.  Feeling so tired or weak that you cannot do your usual activities.  A fast or irregular heartbeat.  New or worse belly pain.     You have signs of infection, such as:  A fever.  Frequent or painful urination or blood in your urine.  Vaginal discharge that smells bad.  New or worse belly pain.     You have symptoms of a blood clot in your leg (called a deep vein thrombosis), such as:  Pain in the calf, back of the knee, thigh, or groin.  Swelling in the leg or groin.  A color change on the leg or groin. The skin may be reddish or purplish, depending on  "your usual skin color.     You have signs of preeclampsia, such as:  Sudden swelling of your face, hands, or feet.  New vision problems (such as dimness, blurring, or seeing spots).  A severe headache.     You have signs of heart failure, such as:  New or increased shortness of breath.  New or worse swelling in your legs, ankles, or feet.  Sudden weight gain, such as more than 2 to 3 pounds in a day or 5 pounds in a week.  Feeling so tired or weak that you cannot do your usual activities.     You had spinal or epidural pain relief and have:  New or worse back pain.  Increased pain, swelling, warmth, or redness at the injection site.  Tingling, weakness, or numbness in your legs or groin.   Watch closely for changes in your health, and be sure to contact your doctor if:    Your vaginal bleeding isn't decreasing.     You feel sad, anxious, or hopeless for more than a few days.     You are having problems with your breasts or breastfeeding.   Where can you learn more?  Go to https://www.[employer].net/patiented  Enter Z768 in the search box to learn more about \"Postpartum: Care Instructions.\"  Current as of: July 11, 2023               Content Version: 13.8    3820-2098 [employer].   Care instructions adapted under license by your healthcare professional. If you have questions about a medical condition or this instruction, always ask your healthcare professional. [employer] disclaims any warranty or liability for your use of this information.        "

## 2024-02-27 VITALS
DIASTOLIC BLOOD PRESSURE: 82 MMHG | HEIGHT: 69 IN | BODY MASS INDEX: 27.64 KG/M2 | RESPIRATION RATE: 16 BRPM | SYSTOLIC BLOOD PRESSURE: 126 MMHG | TEMPERATURE: 97.5 F | HEART RATE: 58 BPM | WEIGHT: 186.6 LBS | OXYGEN SATURATION: 100 %

## 2024-02-27 PROBLEM — D62 ANEMIA DUE TO BLOOD LOSS, ACUTE: Status: ACTIVE | Noted: 2024-02-27

## 2024-02-27 PROCEDURE — 250N000013 HC RX MED GY IP 250 OP 250 PS 637: Performed by: OBSTETRICS & GYNECOLOGY

## 2024-02-27 RX ORDER — IBUPROFEN 800 MG/1
800 TABLET, FILM COATED ORAL EVERY 6 HOURS PRN
Qty: 30 TABLET | Refills: 0 | Status: SHIPPED | OUTPATIENT
Start: 2024-02-27

## 2024-02-27 RX ORDER — DOCUSATE SODIUM 100 MG/1
100 CAPSULE, LIQUID FILLED ORAL DAILY
Qty: 30 CAPSULE | Refills: 1 | Status: SHIPPED | OUTPATIENT
Start: 2024-02-28

## 2024-02-27 RX ORDER — PSEUDOEPHED/ACETAMINOPH/DIPHEN 30MG-500MG
500-1000 TABLET ORAL EVERY 4 HOURS PRN
Qty: 30 TABLET | Refills: 1 | Status: SHIPPED | OUTPATIENT
Start: 2024-02-27

## 2024-02-27 RX ADMIN — DOCUSATE SODIUM 100 MG: 100 CAPSULE, LIQUID FILLED ORAL at 08:23

## 2024-02-27 RX ADMIN — IBUPROFEN 800 MG: 400 TABLET ORAL at 07:36

## 2024-02-27 RX ADMIN — ACETAMINOPHEN 650 MG: 325 TABLET, FILM COATED ORAL at 07:36

## 2024-02-27 RX ADMIN — IBUPROFEN 800 MG: 400 TABLET ORAL at 01:08

## 2024-02-27 RX ADMIN — ACETAMINOPHEN 650 MG: 325 TABLET, FILM COATED ORAL at 01:08

## 2024-02-27 ASSESSMENT — ACTIVITIES OF DAILY LIVING (ADL)
ADLS_ACUITY_SCORE: 20

## 2024-02-27 NOTE — PLAN OF CARE
Goal Outcome Evaluation:  D: Vital signs stable, assessments within defined limits. Baby feeding well. Cord drying, no signs of infection noted. Baby voiding and stooling appropriately for age. No apparent pain.   I: Review of care plan, teaching, and discharge instructions done with mother. Mother acknowledged signs/symptoms to look for and report per discharge instructions. Infant identification with ID bands done, mother verification with signature obtained. Required  screens completed prior to discharge. Hugs and kisses tags removed.  A: Discharge outcomes on care plan met. Mother states understanding and comfort with infant cares and feeding. All questions about baby care addressed.   P: Baby discharged with parents in car seat. Home care ordered. Baby to follow up with pediatrician in 2-3 days.

## 2024-02-27 NOTE — PROGRESS NOTES
[facility]    Post-Partum Progress Note    Assessment & Plan   Assessment:  Post-partum day #2  Normal spontaneous vaginal delivery  Acute blood loss anemia, asymptomatic    Doing well.  No excessive bleeding  Pain well-controlled.    Plan:  Ambulation encouraged  Breast feeding strategies discussed  Discharge later today    Cheyenne Ayala, MS3    Physician Attestation   I, Adriana Nayak MD, was present with the medical/DAWNA student who participated in the service and in the documentation of the note.  I have verified the history and personally performed the physical exam and medical decision making.  I agree with the assessment and plan of care as documented in the note.        Adriana Nayak MD  Date of Service (when I saw the patient): 02/27/24      Interval History   Doing well.  Pain is well-controlled.  No fevers.  No history of foul-smelling vaginal discharge.  Good appetite.  Denies chest pain, shortness of breath, nausea or vomiting.  Vaginal bleeding is similar to a heavy menstrual flow.  Ambulatory.  Breastfeeding without difficulty    Medications    - MEDICATION INSTRUCTIONS -      - MEDICATION INSTRUCTIONS -      oxytocin        docusate sodium  100 mg Oral Daily       Physical Exam   Temp: 97.5  F (36.4  C) Temp src: Oral BP: 126/82 Pulse: 58   Resp: 16        Vitals:    02/25/24 0059 02/27/24 0853   Weight: 89.4 kg (197 lb) 84.6 kg (186 lb 9.6 oz)     Vital Signs with Ranges  Temp:  [97.5  F (36.4  C)-98  F (36.7  C)] 97.5  F (36.4  C)  Pulse:  [58-62] 58  Resp:  [16] 16  BP: (122-134)/(77-85) 126/82  No intake/output data recorded.    Uterine fundus is firm, non-tender and at the level of the umbilicus  Extremities Non-tender  Heart is regular rate and rhythm and lungs clear to auscultation    Data   Recent Labs   Lab Test 02/25/24  0331   AS Negative     Recent Labs   Lab Test 02/26/24  0802 02/25/24  0331 02/07/24  1318 12/12/23  1412   WBC  --  8.1  --  8.0    HGB 9.6* 11.8   < > 10.8*    < > = values in this interval not displayed.     Recent Labs   Lab Test 08/02/23  0916   RUQIGG Positive

## 2024-02-27 NOTE — LACTATION NOTE
Follow up Lactation visit with Jenna, significant other Tru & baby boy [place]. Getting ready for discharge. Jenna reports feeding is going better with the nipple shield. She shared [place] showed a lot of interest in feeding overnight, but then would fall asleep quickly at the breast, so they ended up supplementing via bottle with formula x1. He latched again this morning and had a good, longer breastfeed. Offered support and encouragement.    Discussed cluster feeding, what it is and when to expect it, The Second Night, satiety cues, feeding cues, and reviewed Feeding Log for home use. Encouraged to review Breastfeeding section in Your Guide to Postpartum & [place] Care.    Reviewed milk supply and engorgement. Reviewed typical timeline of milk supply initiation and progression over first 3-5 days postpartum. Discussed comfort measures for engorgement, plugged duct treatment, and warning signs of breast infection. General questions answered regarding pumping, when it's helpful and necessary. Encourage pumping if supplementing, or if Horacio is not latching and feeding well at least every 3 hours.    Feeding plan: Recommend unlimited, frequent breast feedings: At least 8 - 12 times every 24 hours. Avoid pacifiers and supplementation with formula unless medically indicated. If supplementing, encourage pumping. Encouraged use of feeding log and to record feedings, and void/stool patterns. Jenna has a breast pump for home use. Follow up with [facility], also encourage Lactation follow up appointment since using nipple shield. Reviewed outpatient lactation resources. Jenna & Tru appreciative of visit.    Lauren Velasco, RN, BSN, MNN, IBCLC

## 2024-02-27 NOTE — PROGRESS NOTES
[facility] Post-Op / Progress Note         Assessment and Plan:    Assessment:   Postpartum Day 1, spontaneous vaginal delivery     Doing well.  2nd degree perineal laceration  No excessive bleeding.  Pain well-controlled.      Plan:   Ambulation encouraged  Breast feeding strategies discussed  Repeat Hemoglobin today  Lactation consultation  Pain control measures as needed    Anticipate discharge tomorrow           Interval History:   Patient seen with FOB (Tru) at bedside. She is doing well today, able to get some sleep, enjoying time bonding with baby. + flatus, voiding without difficulty, ambulating in room.           Significant Problems:    Principal Problem:     (spontaneous vaginal delivery)          Medications:   All medications related to the patient's surgery have been reviewed  Current Facility-Administered Medications   Medication    acetaminophen (TYLENOL) tablet 650 mg    benzocaine (AMERICAINE) 20 % topical spray    bisacodyl (DULCOLAX) suppository 10 mg    carboprost (HEMABATE) injection 250 mcg    And    loperamide (IMODIUM) capsule 4 mg    docusate sodium (COLACE) capsule 100 mg    hydrocortisone (Perianal) (ANUSOL-HC) 2.5 % cream    ibuprofen (ADVIL/MOTRIN) tablet 800 mg    lanolin cream    loperamide (IMODIUM) capsule 2 mg    methylergonovine (METHERGINE) injection 200 mcg    misoprostol (CYTOTEC) tablet 400 mcg    Or    misoprostol (CYTOTEC) tablet 800 mcg    nalbuphine (NUBAIN) injection 2.6-5 mg    No MMR Needed - Assessment: Patient does not need MMR vaccine    No Tdap Needed - Assessment: Patient does not need Tdap vaccine    oxytocin (PITOCIN) 30 units in 500 mL 0.9% NaCl infusion    oxytocin (PITOCIN) injection 10 Units    tranexamic acid 1 g in 100 mL NS IV bag (premix)             Physical Exam:   All vitals stable  Uterine fundus is firm, non-tender and at the level of the umbilicus  Extremities Non-tender, minimal pedal edema    Miranda De Guzman  JOVANNA

## 2024-02-27 NOTE — PLAN OF CARE
BPs 120-130s/70-80s. Denies headache, vision changes, epigastric pain. Fundus firm. Lochia scant. Using Tylenol/Motrin for perineal soreness and uterine cramping with good relief. Voiding without issues. Up ad avery. Breastfeeding  per cues,  has been clusterfeeding. Encouraged to call with questions/concerns. Spouse at bedside and supportive.

## 2024-02-27 NOTE — PLAN OF CARE
Goal Outcome Evaluation:       D: VSS, assessments WDL.   I: Pt. received complete discharge paperwork.  Pt. was given times of last dose for all discharge medications in writing on discharge medication sheets.  Discharge teaching included home medication, pain management, activity restrictions, postpartum cares, and signs and symptoms of infection.    A: Discharge outcomes on care plan met.  Mother states understanding and comfort with self care and follow up care.   P: Pt. Discharged.  Pt. was accompanied by RN and left with personal belongings.  Pt. to follow up with OB provider per discharge instructions.  Pt. had no further questions at the time of discharge and no unmet needs were identified.

## 2024-04-23 NOTE — PROGRESS NOTES
"SUBJECTIVE:  Jenna Tripp,  is here for a postpartum visit.  She had a  on 2024 delivering a healthy baby boy, named Horacio AIDA Tripp  weighing 7 lbs 5.1 oz at term.      HPI:  Things are going well, mom and baby healthy  Had strep directly after giving birth  Able to sleep ~6 hours most nights  Pumping and bottle feeding  Bleeding in initial post-partum days, resolved  Wants to confirm lacerations healing well  No concerns      Last PHQ-9 score on record=       2024     1:13 PM   PHQ-9 SCORE   PHQ-9 Total Score 0         7/10/2023     2:45 PM 2024     2:57 PM 2024     1:13 PM   JASON-7 SCORE   Total Score 0 (minimal anxiety)     Total Score 0 0 0       Pap/HPV History:   Lab Results   Component Value Date    PAP/HPV NILM 2023     Delivery complications:  No  Breast feeding:  Yes, pumping  Bladder problems:  No  Bowel problems/hemorrhoids:  No  Episiotomy/laceration/incision healed? Yes:   Vaginal flow:  None  [place]:  No  Contraception: none  Emotional adjustment:  doing well, happy, and tired  Back to work:  Yes, will be going back on May 20, 2024    12 point review of systems negative other than symptoms noted below or in the HPI.    OBJECTIVE:  Vitals: /62 (BP Location: Right arm, Patient Position: Sitting, Cuff Size: Adult Regular)   Ht 1.753 m (5' 9\")   Wt 77.9 kg (171 lb 12.8 oz)   LMP 2023 (Exact Date)   Breastfeeding Yes   BMI 25.37 kg/m    BMI= Body mass index is 25.37 kg/m .  General - pleasant female in no acute distress.  Breast -  deferred  Abdomen - No incision  Pelvic - EG: normal adult female, BUS: within normal limits, Vagina: well rugated, no discharge, Cervix: no lesions or CMT, Uterus: firm, normal sized and nontender, midplane in position. Adnexae: no masses or tenderness.  Rectovaginal - deferred.    ASSESSMENT:    ICD-10-CM    1. Routine postpartum follow-up  Z39.2           PLAN:  May resume normal activities without " restrictions.  Pap smear was not done today.  Lacerations have healed well, no concerns    Full counseling was provided, and all questions were answered.   Return to clinic in one year for an annual visit.     Radha López, MS3  [employer]    Physician Attestation   I, Adriana Nayak MD, was present with the medical/DAWNA student who participated in the service and in the documentation of the note.  I have verified the history and personally performed the physical exam and medical decision making.  I agree with the assessment and plan of care as documented in the note.        Adriana Nayak MD  Date of Service (when I saw the patient): 04/24/24

## 2024-04-24 ENCOUNTER — PRENATAL OFFICE VISIT (OUTPATIENT)
Dept: OBGYN | Facility: CLINIC | Age: 29
End: 2024-04-24
Payer: COMMERCIAL

## 2024-04-24 VITALS
BODY MASS INDEX: 25.45 KG/M2 | SYSTOLIC BLOOD PRESSURE: 114 MMHG | DIASTOLIC BLOOD PRESSURE: 62 MMHG | HEIGHT: 69 IN | WEIGHT: 171.8 LBS

## 2024-04-24 PROBLEM — D62 ANEMIA DUE TO BLOOD LOSS, ACUTE: Status: RESOLVED | Noted: 2024-02-27 | Resolved: 2024-04-24

## 2024-04-24 PROCEDURE — 99207 PR POST PARTUM EXAM: CPT | Performed by: OBSTETRICS & GYNECOLOGY

## 2024-04-24 ASSESSMENT — PATIENT HEALTH QUESTIONNAIRE - PHQ9
SUM OF ALL RESPONSES TO PHQ QUESTIONS 1-9: 0
5. POOR APPETITE OR OVEREATING: NOT AT ALL

## 2024-04-24 ASSESSMENT — ANXIETY QUESTIONNAIRES
GAD7 TOTAL SCORE: 0
GAD7 TOTAL SCORE: 0
5. BEING SO RESTLESS THAT IT IS HARD TO SIT STILL: NOT AT ALL
2. NOT BEING ABLE TO STOP OR CONTROL WORRYING: NOT AT ALL
6. BECOMING EASILY ANNOYED OR IRRITABLE: NOT AT ALL
1. FEELING NERVOUS, ANXIOUS, OR ON EDGE: NOT AT ALL
3. WORRYING TOO MUCH ABOUT DIFFERENT THINGS: NOT AT ALL
IF YOU CHECKED OFF ANY PROBLEMS ON THIS QUESTIONNAIRE, HOW DIFFICULT HAVE THESE PROBLEMS MADE IT FOR YOU TO DO YOUR WORK, TAKE CARE OF THINGS AT HOME, OR GET ALONG WITH OTHER PEOPLE: NOT DIFFICULT AT ALL
7. FEELING AFRAID AS IF SOMETHING AWFUL MIGHT HAPPEN: NOT AT ALL

## 2024-07-28 ENCOUNTER — HEALTH MAINTENANCE LETTER (OUTPATIENT)
Age: 29
End: 2024-07-28

## 2025-08-10 ENCOUNTER — HEALTH MAINTENANCE LETTER (OUTPATIENT)
Age: 30
End: 2025-08-10